# Patient Record
Sex: MALE | Race: WHITE | NOT HISPANIC OR LATINO | Employment: OTHER | URBAN - METROPOLITAN AREA
[De-identification: names, ages, dates, MRNs, and addresses within clinical notes are randomized per-mention and may not be internally consistent; named-entity substitution may affect disease eponyms.]

---

## 2017-01-03 PROBLEM — E78.1 TYPE 2 DIABETES MELLITUS WITH HYPERTRIGLYCERIDEMIA: Status: ACTIVE | Noted: 2017-01-03

## 2017-01-03 PROBLEM — N18.30 ACUTE RENAL FAILURE SUPERIMPOSED ON STAGE 3 CHRONIC KIDNEY DISEASE: Status: ACTIVE | Noted: 2017-01-03

## 2017-01-03 PROBLEM — R65.10: Status: ACTIVE | Noted: 2017-01-03

## 2017-01-03 PROBLEM — E11.69 TYPE 2 DIABETES MELLITUS WITH HYPERTRIGLYCERIDEMIA: Status: ACTIVE | Noted: 2017-01-03

## 2017-01-03 PROBLEM — N17.9 ACUTE RENAL FAILURE SUPERIMPOSED ON STAGE 3 CHRONIC KIDNEY DISEASE: Status: ACTIVE | Noted: 2017-01-03

## 2017-01-03 PROBLEM — I63.9 CVA (CEREBRAL VASCULAR ACCIDENT): Status: ACTIVE | Noted: 2017-01-03

## 2017-01-03 PROBLEM — D72.829 LEUKOCYTOSIS: Status: ACTIVE | Noted: 2017-01-03

## 2017-01-09 ENCOUNTER — TELEPHONE (OUTPATIENT)
Dept: NEPHROLOGY | Facility: CLINIC | Age: 47
End: 2017-01-09

## 2017-01-10 ENCOUNTER — PATIENT OUTREACH (OUTPATIENT)
Dept: ADMINISTRATIVE | Facility: CLINIC | Age: 47
End: 2017-01-10
Payer: COMMERCIAL

## 2017-01-12 ENCOUNTER — TELEPHONE (OUTPATIENT)
Dept: GASTROENTEROLOGY | Facility: CLINIC | Age: 47
End: 2017-01-12

## 2017-01-30 ENCOUNTER — PATIENT OUTREACH (OUTPATIENT)
Dept: ADMINISTRATIVE | Facility: CLINIC | Age: 47
End: 2017-01-30
Payer: MEDICARE

## 2017-01-30 RX ORDER — INSULIN LISPRO 100 [IU]/ML
INJECTION, SOLUTION INTRAVENOUS; SUBCUTANEOUS
COMMUNITY
End: 2017-07-20

## 2017-01-30 RX ORDER — ONDANSETRON 4 MG/1
4 TABLET, ORALLY DISINTEGRATING ORAL EVERY 6 HOURS PRN
COMMUNITY
End: 2018-03-01

## 2017-01-30 RX ORDER — BACLOFEN 10 MG/1
10 TABLET ORAL 3 TIMES DAILY
COMMUNITY
End: 2017-08-10

## 2017-01-30 NOTE — PATIENT INSTRUCTIONS
Discharge Instructions for Acute Pancreatitis  You have been diagnosed with acute pancreatitis. Your pancreas is inflamed or swollen. The pancreas is an organ that produces chemicals and hormones that help you digest food and use sugar for energy. Gallstones are one of the most common causes of this condition. These hard stones form in the gallbladder, which shares a passage with the pancreas into the small intestine. If gallstones block this passage, fluid cant escape the pancreas. The fluid backs up and causes inflammation.  Immediate home care  · Find someone to drive you to appointments. Acute pancreatitis is a serious condition, and you should never drive if you are experiencing symptoms.  · Stop drinking if your illness was caused by alcohol.  ¨ Ask your doctor about alcohol abuse programs and support groups such as Alcoholics Anonymous.  ¨ Ask your doctor about prescription medications that can help you stop drinking.  · Take your medications exactly as directed. Dont skip doses.  · Eat a low-fat diet. Ask your doctor for menus and other diet information.  · Learn to take your own pulse. Keep a record of your results. Ask your doctor which readings mean that you need medical attention.  Ongoing care  · Tell your doctor about any medications you are taking. Some can cause acute pancreatitis.  · Tell your doctor if you lose weight without dieting.  · Watch for symptoms that indicate your pancreatitis has returned. These symptoms include abdominal pain, nausea and vomiting, and fever.  · Keep all follow-up appointments with your doctor. Delayed complications are common with this illness.  Follow-up  Make a follow-up appointment as directed by our staff.     When to call your doctor  Call your doctor immediately if you have any of the following:  · Fever of 100.4°F (38.0°C) or higher  · Severe pain from your upper abdomen to your back  · Nausea and vomiting  · Dizziness or lightheadedness  · Yellowing of your  skin or eyes (jaundice)  · Bruises on your abdomen or back  · Abdominal swelling and tenderness  · Rapid pulse  · Shallow, fast breathing   © 6854-2385 [employer]. [address] [place] [address], [place] [address]. All rights reserved. This information is not intended as a substitute for professional medical care. Always follow your healthcare professional's instructions.

## 2017-05-05 ENCOUNTER — TELEPHONE (OUTPATIENT)
Dept: GASTROENTEROLOGY | Facility: CLINIC | Age: 47
End: 2017-05-05

## 2017-05-09 ENCOUNTER — TELEPHONE (OUTPATIENT)
Dept: GASTROENTEROLOGY | Facility: CLINIC | Age: 47
End: 2017-05-09

## 2017-05-09 NOTE — TELEPHONE ENCOUNTER
----- Message from Teena Hess sent at 5/8/2017  2:30 PM CDT -----  New patient no. [phone]    Patient returned your call.

## 2017-05-10 ENCOUNTER — TELEPHONE (OUTPATIENT)
Dept: GASTROENTEROLOGY | Facility: CLINIC | Age: 47
End: 2017-05-10

## 2017-05-11 ENCOUNTER — TELEPHONE (OUTPATIENT)
Dept: GASTROENTEROLOGY | Facility: CLINIC | Age: 47
End: 2017-05-11

## 2017-05-11 NOTE — TELEPHONE ENCOUNTER
----- Message from Selina Gudino sent at 5/11/2017  8:24 AM CDT -----  Contact: [phone] or [phone]   Patient called in returning your call. Please advise

## 2017-05-11 NOTE — TELEPHONE ENCOUNTER
----- Message from Dorothy Helm sent at 5/10/2017  2:50 PM CDT -----  Contact: mother /[phone]  Pt mother would like for you to call her /[phone]. Please advise.

## 2017-05-19 ENCOUNTER — TELEPHONE (OUTPATIENT)
Dept: GASTROENTEROLOGY | Facility: CLINIC | Age: 47
End: 2017-05-19

## 2017-07-20 ENCOUNTER — OFFICE VISIT (OUTPATIENT)
Dept: GASTROENTEROLOGY | Facility: CLINIC | Age: 47
End: 2017-07-20
Payer: MEDICARE

## 2017-07-20 VITALS — WEIGHT: 244.69 LBS | BODY MASS INDEX: 40.77 KG/M2 | HEIGHT: 65 IN

## 2017-07-20 DIAGNOSIS — K85.90 PANCREATITIS, UNSPECIFIED PANCREATITIS TYPE: Primary | ICD-10-CM

## 2017-07-20 PROCEDURE — 99999 PR PBB SHADOW E&M-EST. PATIENT-LVL II: CPT | Mod: PBBFAC,,, | Performed by: INTERNAL MEDICINE

## 2017-07-20 PROCEDURE — 99214 OFFICE O/P EST MOD 30 MIN: CPT | Mod: S$GLB,,, | Performed by: INTERNAL MEDICINE

## 2017-07-20 RX ORDER — SIMVASTATIN 20 MG/1
40 TABLET, FILM COATED ORAL
COMMUNITY
Start: 2017-07-13 | End: 2017-08-10 | Stop reason: DRUGHIGH

## 2017-07-20 RX ORDER — INSULIN ASPART 100 [IU]/ML
20 INJECTION, SOLUTION INTRAVENOUS; SUBCUTANEOUS
COMMUNITY
Start: 2016-09-29

## 2017-07-20 RX ORDER — ERGOCALCIFEROL 1.25 MG/1
50000 CAPSULE ORAL
COMMUNITY
Start: 2017-07-13

## 2017-07-20 RX ORDER — INSULIN GLARGINE 100 [IU]/ML
30 INJECTION, SOLUTION SUBCUTANEOUS 2 TIMES DAILY
COMMUNITY
Start: 2016-09-29 | End: 2018-07-12

## 2017-07-20 RX ORDER — HYDROGEN PEROXIDE 3 %
20 SOLUTION, NON-ORAL MISCELLANEOUS
COMMUNITY
End: 2017-08-10

## 2017-07-20 RX ORDER — FERROUS SULFATE 325(65) MG
325 TABLET ORAL
COMMUNITY
Start: 2017-06-21

## 2017-07-20 RX ORDER — METOPROLOL SUCCINATE 50 MG/1
100 TABLET, EXTENDED RELEASE ORAL DAILY
COMMUNITY

## 2017-07-20 RX ORDER — AMLODIPINE BESYLATE 5 MG/1
10 TABLET ORAL
COMMUNITY
Start: 2017-07-13 | End: 2018-03-01

## 2017-07-20 RX ORDER — BACLOFEN 10 MG/1
TABLET ORAL
COMMUNITY
Start: 2015-04-27 | End: 2017-07-20 | Stop reason: SDUPTHER

## 2017-07-20 NOTE — LETTER
July 23, 2017      Kris Reno, [facility]  [address] [place] [address]           [place] [facility]  [address]  [place] [address]  Phone: [phone]          Patient: Stephon Lock   MR Number: [id number]   YOB: 1970   Date of Visit: 7/20/2017       Dear Kris Reno:    Thank you for referring Stephon Lock to me for evaluation. Attached you will find relevant portions of my assessment and plan of care.    If you have questions, please do not hesitate to call me. I look forward to following Stephon Lock along with you.    Sincerely,        Enclosure  CC:  No Recipients    If you would like to receive this communication electronically, please contact [email or web address] or [phone] to request more information on [employer] access.    For providers and/or their staff who would like to refer a patient to [facility], please contact us through our one-stop-shop provider referral line, [facility], at [phone].    If you feel you have received this communication in error or would no longer like to receive these types of communications, please e-mail [email or web address]

## 2017-07-20 NOTE — PROGRESS NOTES
"Subjective:       Patient ID: Stephon Lock is a 47 y.o. male.    Chief Complaint: Pancreatitis    HPI     Pancreatitis/ Idiopathic   Patient complains of pancreatitis type pain. Symptoms have been present several months. Symptoms include abdominal pain. Symptoms have been gradually worsening.  The pain is located in the epigastrium, is described as boring, moderate, and is rated worst day -4/ 10. The pain radiates to the abdomen diffusely. The patient states that the pain is relieved by pain medication. Aggravating factors include food ingestion. The patient has tried narcotics with moderate relief.  Alcohol history:none. Prior diagnostic studies: CT Scan  Shows pseudocyst     C/O" extreme fatigue " .    Review of Systems   Constitutional: Positive for appetite change. Negative for activity change, fever and unexpected weight change.   HENT: Negative for congestion, trouble swallowing and voice change.    Respiratory: Negative for apnea, wheezing and stridor.    Cardiovascular: Negative for chest pain.   Gastrointestinal: Negative for abdominal distention, abdominal pain, blood in stool, rectal pain and vomiting.   Genitourinary: Negative for flank pain and urgency.   Musculoskeletal: Negative for arthralgias, neck pain and neck stiffness.   Skin: Negative for pallor and rash.   Neurological: Negative for dizziness, tremors and weakness.   Hematological: Negative for adenopathy.   Psychiatric/Behavioral: The patient is not nervous/anxious.                Objective:         Physical Exam   Constitutional: He is oriented to person, place, and time. He appears well-developed and well-nourished.   HENT:   Head: Normocephalic and atraumatic.   Eyes: EOM are normal. Pupils are equal, round, and reactive to light. Left eye exhibits no discharge. No scleral icterus.   Neck: Normal range of motion. Neck supple. No JVD present. No tracheal deviation present.   Cardiovascular: Regular rhythm and normal heart sounds.  Exam " reveals no gallop and no friction rub.    Pulmonary/Chest: Effort normal and breath sounds normal. He has no wheezes. He has no rales.   Abdominal: There is no tenderness. There is no rebound and no guarding. No hernia.   Musculoskeletal: He exhibits no edema or tenderness.   Neurological: He is alert and oriented to person, place, and time. He displays normal reflexes. Coordination normal.   Skin: Skin is warm. No rash noted.   Psychiatric: His behavior is normal. Thought content normal.   Vitals reviewed.              Assessment:     Idiopathic Pancreatitis   Fatigue related to pancreatitis  Pseudocyst   Plan:     EUS   CBC  CMP  Amylase   Lipase

## 2017-07-25 DIAGNOSIS — K85.90 PANCREATITIS, UNSPECIFIED PANCREATITIS TYPE: Primary | ICD-10-CM

## 2017-07-25 DIAGNOSIS — K85.90 PANCREATITIS: ICD-10-CM

## 2017-07-25 RX ORDER — SODIUM CHLORIDE 9 MG/ML
INJECTION, SOLUTION INTRAVENOUS CONTINUOUS
Status: CANCELLED | OUTPATIENT
Start: 2017-07-25

## 2017-07-26 ENCOUNTER — ANESTHESIA EVENT (OUTPATIENT)
Dept: ENDOSCOPY | Facility: HOSPITAL | Age: 47
End: 2017-07-26
Payer: MEDICARE

## 2017-07-26 ENCOUNTER — HOSPITAL ENCOUNTER (OUTPATIENT)
Facility: HOSPITAL | Age: 47
Discharge: HOME | End: 2017-07-26
Attending: INTERNAL MEDICINE | Admitting: INTERNAL MEDICINE
Payer: MEDICARE

## 2017-07-26 ENCOUNTER — ANESTHESIA (OUTPATIENT)
Dept: ENDOSCOPY | Facility: HOSPITAL | Age: 47
End: 2017-07-26
Payer: MEDICARE

## 2017-07-26 VITALS
HEART RATE: 64 BPM | RESPIRATION RATE: 18 BRPM | BODY MASS INDEX: 38.57 KG/M2 | OXYGEN SATURATION: 98 % | HEIGHT: 66 IN | DIASTOLIC BLOOD PRESSURE: 99 MMHG | WEIGHT: 240 LBS | TEMPERATURE: 97 F | SYSTOLIC BLOOD PRESSURE: 149 MMHG

## 2017-07-26 DIAGNOSIS — K85.90 PANCREATITIS, UNSPECIFIED PANCREATITIS TYPE: ICD-10-CM

## 2017-07-26 DIAGNOSIS — I10 HYPERTENSION: ICD-10-CM

## 2017-07-26 DIAGNOSIS — K85.90 PANCREATITIS: ICD-10-CM

## 2017-07-26 LAB — GLUCOSE SERPL-MCNC: 296 MG/DL (ref 70–110)

## 2017-07-26 PROCEDURE — 88305 TISSUE EXAM BY PATHOLOGIST: CPT | Performed by: PATHOLOGY

## 2017-07-26 PROCEDURE — 27201012 HC FORCEPS, HOT/COLD, DISP: Performed by: INTERNAL MEDICINE

## 2017-07-26 PROCEDURE — 43239 EGD BIOPSY SINGLE/MULTIPLE: CPT | Mod: 59,,, | Performed by: INTERNAL MEDICINE

## 2017-07-26 PROCEDURE — 43239 EGD BIOPSY SINGLE/MULTIPLE: CPT | Performed by: INTERNAL MEDICINE

## 2017-07-26 PROCEDURE — 37000008 HC ANESTHESIA 1ST 15 MINUTES: Performed by: INTERNAL MEDICINE

## 2017-07-26 PROCEDURE — 25000003 PHARM REV CODE 250: Performed by: ANESTHESIOLOGY

## 2017-07-26 PROCEDURE — 37000009 HC ANESTHESIA EA ADD 15 MINS: Performed by: INTERNAL MEDICINE

## 2017-07-26 PROCEDURE — 43242 EGD US FINE NEEDLE BX/ASPIR: CPT | Performed by: INTERNAL MEDICINE

## 2017-07-26 PROCEDURE — 27202059 HC NEEDLE, FNA (ANY): Performed by: INTERNAL MEDICINE

## 2017-07-26 PROCEDURE — 93005 ELECTROCARDIOGRAM TRACING: CPT

## 2017-07-26 PROCEDURE — 63600175 PHARM REV CODE 636 W HCPCS: Performed by: NURSE ANESTHETIST, CERTIFIED REGISTERED

## 2017-07-26 PROCEDURE — 25000003 PHARM REV CODE 250: Performed by: INTERNAL MEDICINE

## 2017-07-26 PROCEDURE — 88305 TISSUE EXAM BY PATHOLOGIST: CPT | Mod: 26,,, | Performed by: PATHOLOGY

## 2017-07-26 PROCEDURE — 93010 ELECTROCARDIOGRAM REPORT: CPT | Mod: ,,, | Performed by: INTERNAL MEDICINE

## 2017-07-26 PROCEDURE — 88112 CYTOPATH CELL ENHANCE TECH: CPT | Mod: 26,,, | Performed by: PATHOLOGY

## 2017-07-26 PROCEDURE — 43238 EGD US FINE NEEDLE BX/ASPIR: CPT | Mod: ,,, | Performed by: INTERNAL MEDICINE

## 2017-07-26 PROCEDURE — 88112 CYTOPATH CELL ENHANCE TECH: CPT | Performed by: PATHOLOGY

## 2017-07-26 PROCEDURE — 25000003 PHARM REV CODE 250: Performed by: NURSE ANESTHETIST, CERTIFIED REGISTERED

## 2017-07-26 RX ORDER — SODIUM CHLORIDE 9 MG/ML
INJECTION, SOLUTION INTRAVENOUS CONTINUOUS
Status: DISCONTINUED | OUTPATIENT
Start: 2017-07-26 | End: 2017-07-26 | Stop reason: HOSPADM

## 2017-07-26 RX ORDER — METOPROLOL TARTRATE 1 MG/ML
5 INJECTION, SOLUTION INTRAVENOUS ONCE
Status: COMPLETED | OUTPATIENT
Start: 2017-07-26 | End: 2017-07-26

## 2017-07-26 RX ORDER — PROPOFOL 10 MG/ML
VIAL (ML) INTRAVENOUS
Status: DISCONTINUED | OUTPATIENT
Start: 2017-07-26 | End: 2017-07-26

## 2017-07-26 RX ORDER — FENTANYL CITRATE 50 UG/ML
INJECTION, SOLUTION INTRAMUSCULAR; INTRAVENOUS
Status: DISCONTINUED | OUTPATIENT
Start: 2017-07-26 | End: 2017-07-26

## 2017-07-26 RX ORDER — LIDOCAINE HCL/PF 100 MG/5ML
SYRINGE (ML) INTRAVENOUS
Status: DISCONTINUED | OUTPATIENT
Start: 2017-07-26 | End: 2017-07-26

## 2017-07-26 RX ORDER — METOPROLOL TARTRATE 1 MG/ML
2 INJECTION, SOLUTION INTRAVENOUS ONCE
Status: DISCONTINUED | OUTPATIENT
Start: 2017-07-26 | End: 2017-07-26

## 2017-07-26 RX ORDER — NAPROXEN SODIUM 220 MG/1
81 TABLET, FILM COATED ORAL DAILY
COMMUNITY

## 2017-07-26 RX ORDER — PROPOFOL 10 MG/ML
VIAL (ML) INTRAVENOUS CONTINUOUS PRN
Status: DISCONTINUED | OUTPATIENT
Start: 2017-07-26 | End: 2017-07-26

## 2017-07-26 RX ORDER — GLYCOPYRROLATE 0.2 MG/ML
INJECTION INTRAMUSCULAR; INTRAVENOUS
Status: DISCONTINUED | OUTPATIENT
Start: 2017-07-26 | End: 2017-07-26

## 2017-07-26 RX ADMIN — TOPICAL ANESTHETIC 1 EACH: 200 SPRAY DENTAL; PERIODONTAL at 02:07

## 2017-07-26 RX ADMIN — LIDOCAINE HYDROCHLORIDE 100 MG: 20 INJECTION, SOLUTION INTRAVENOUS at 02:07

## 2017-07-26 RX ADMIN — PROPOFOL 40 MG: 10 INJECTION, EMULSION INTRAVENOUS at 02:07

## 2017-07-26 RX ADMIN — SODIUM CHLORIDE: 0.9 INJECTION, SOLUTION INTRAVENOUS at 02:07

## 2017-07-26 RX ADMIN — GLYCOPYRROLATE 0.2 MG: 0.2 INJECTION, SOLUTION INTRAMUSCULAR; INTRAVENOUS at 02:07

## 2017-07-26 RX ADMIN — PROPOFOL 150 MCG/KG/MIN: 10 INJECTION, EMULSION INTRAVENOUS at 02:07

## 2017-07-26 RX ADMIN — METOPROLOL TARTRATE 5 MG: 5 INJECTION INTRAVENOUS at 01:07

## 2017-07-26 RX ADMIN — FENTANYL CITRATE 50 MCG: 50 INJECTION, SOLUTION INTRAMUSCULAR; INTRAVENOUS at 02:07

## 2017-07-26 NOTE — TRANSFER OF CARE
"Anesthesia Transfer of Care Note    Patient: Stephon Lock    Procedure(s) Performed: Procedure(s) (LRB):  ULTRASOUND-ENDOSCOPIC-UPPER (N/A)    Patient location: GI    Anesthesia Type: MAC and epidural    Transport from OR: Transported from OR on room air with adequate spontaneous ventilation    Post pain: adequate analgesia    Post assessment: no apparent anesthetic complications    Post vital signs: stable    Level of consciousness: responds to stimulation and sedated    Nausea/Vomiting: no nausea/vomiting    Complications: none    Transfer of care protocol was followed      Last vitals:   Visit Vitals  BP (!) 173/91   Pulse 68   Temp 36.9 °C (98.4 °F) (Oral)   Resp 20   Ht 5' 6" (1.676 m)   Wt 108.9 kg (240 lb)   SpO2 99%   BMI 38.74 kg/m²     "

## 2017-07-26 NOTE — ANESTHESIA POSTPROCEDURE EVALUATION
"Anesthesia Post Evaluation    Patient: Stephon Lock    Procedure(s) Performed: Procedure(s) (LRB):  ULTRASOUND-ENDOSCOPIC-UPPER (N/A)    Final Anesthesia Type: MAC  Patient location during evaluation: GI PACU  Patient participation: Yes- Able to Participate  Level of consciousness: awake and alert  Post-procedure vital signs: reviewed and stable  Pain management: adequate  Airway patency: patent  PONV status at discharge: No PONV  Anesthetic complications: no      Cardiovascular status: hemodynamically stable  Respiratory status: unassisted, spontaneous ventilation and room air  Hydration status: euvolemic  Follow-up not needed.        Visit Vitals  BP (!) 173/91   Pulse 68   Temp 36.9 °C (98.4 °F) (Oral)   Resp 20   Ht 5' 6" (1.676 m)   Wt 108.9 kg (240 lb)   SpO2 99%   BMI 38.74 kg/m²       Pain/Sebas Score: Pain Assessment Performed: Yes (7/26/2017  1:50 PM)  Presence of Pain: denies (7/26/2017  1:50 PM)      "

## 2017-07-26 NOTE — DISCHARGE INSTRUCTIONS
Post Upper EUS Instructions:     Stephon Lock  7/26/2017  Alejandro Decker MD    1. Do Not eat or drink anything for 1 hour. Try sips of water first. If tolerated, resume your regular diet or one recommended by your physician.  2. Do not drive, or operate machinery, make critical decisions, or do activities that require coordination or balance for 24 hours.  3. You may experience a sore throat for 24 to 48 hours. You may use throat lozenges or gargle with warm salt water to relieve the discomfort.  4. Because air was put into your stomach during the procedure, you may experience some belching.   Go directly to the emergency room if you notice any of the following:                              Chills and/or fever over 101                Persistent vomiting or vomiting with blood                Severe abdominal pain, other than gas cramps                Severe chest pain                Black, tarry stools    If you have any questions or problems, please call your Physician:    Alejandro Decker MD     Lab Results: [phone]    If a complication or emergency situation arises and you are unable to reach your Physician - GO TO THE EMERGENCY ROOM.

## 2017-07-26 NOTE — ANESTHESIA PREPROCEDURE EVALUATION
07/26/2017  Stephon Lock is a 47 y.o., male for upper EUS under MAC. Pt obese, sleep apnea.     Anesthesia Evaluation     I have reviewed the Nursing Notes.   I have reviewed the Medications.     Review of Systems  Anesthesia Hx:   Denies Personal Hx of Anesthesia complications.   Social:  No Alcohol Use, Non-Smoker   Cardiovascular:   Exercise tolerance: good Hypertension ECG has been reviewed.    Renal/:   Chronic Renal Disease, CRI    Neurological:   CVA (7/2014)    Endocrine:   Diabetes        Physical Exam  General:  Obesity    Airway/Jaw/Neck:  Airway Findings: Tongue: Large Mallampati: III  Jaw/Neck Findings:  Neck Findings:  Girth Increased      Dental:  Dental Findings: Periodontal disease, Severe    Chest/Lungs:  Chest/Lungs Clear    Heart/Vascular:  Heart Findings: Normal       Mental Status:  Mental Status Findings:  Alert and Oriented         Anesthesia Plan  Type of Anesthesia, risks & benefits discussed:  Anesthesia Type:  MAC, general  Patient's Preference:   Intra-op Monitoring Plan:   Intra-op Monitoring Plan Comments:   Post Op Pain Control Plan:   Post Op Pain Control Plan Comments:   Induction:    Beta Blocker:  Patient is on a Beta-Blocker and has received one dose within the past 24 hours (No further documentation required).     Beta Blocker Comments: Given metoprolol on arrival; pt out of it for a week  Informed Consent: Patient understands risks and agrees with Anesthesia plan.  Questions answered. Anesthesia consent signed with patient.  ASA Score: 3     Day of Surgery Review of History & Physical:            Ready For Surgery From Anesthesia Perspective.

## 2017-08-07 ENCOUNTER — TELEPHONE (OUTPATIENT)
Dept: GASTROENTEROLOGY | Facility: CLINIC | Age: 47
End: 2017-08-07

## 2017-08-07 NOTE — TELEPHONE ENCOUNTER
----- Message from Selina Gudino sent at 8/7/2017  9:22 AM CDT -----  Contact: [phone]/ Leidy pt's wife   Pt's wife its requesting pt's biopsy test results done two weeks ago . Pt also needs to know what to do next . Pt also its having severe stomach pain. Please advise

## 2017-08-07 NOTE — TELEPHONE ENCOUNTER
Spoke with pts mother, informed of results and pt has a f/u appt this week with Dr Decker to discuss abd pain

## 2017-08-10 ENCOUNTER — OFFICE VISIT (OUTPATIENT)
Dept: GASTROENTEROLOGY | Facility: CLINIC | Age: 47
End: 2017-08-10
Payer: MEDICARE

## 2017-08-10 ENCOUNTER — HOSPITAL ENCOUNTER (OUTPATIENT)
Dept: RADIOLOGY | Facility: HOSPITAL | Age: 47
Discharge: HOME | End: 2017-08-10
Attending: INTERNAL MEDICINE
Payer: MEDICARE

## 2017-08-10 VITALS
BODY MASS INDEX: 38.58 KG/M2 | HEIGHT: 66 IN | SYSTOLIC BLOOD PRESSURE: 168 MMHG | WEIGHT: 240.06 LBS | DIASTOLIC BLOOD PRESSURE: 88 MMHG

## 2017-08-10 DIAGNOSIS — R10.9 ABDOMINAL PAIN, UNSPECIFIED LOCATION: Primary | ICD-10-CM

## 2017-08-10 DIAGNOSIS — R10.9 ABDOMINAL PAIN, UNSPECIFIED LOCATION: ICD-10-CM

## 2017-08-10 PROCEDURE — 3077F SYST BP >= 140 MM HG: CPT | Mod: S$GLB,,, | Performed by: INTERNAL MEDICINE

## 2017-08-10 PROCEDURE — 74150 CT ABDOMEN W/O CONTRAST: CPT | Mod: TC

## 2017-08-10 PROCEDURE — 3008F BODY MASS INDEX DOCD: CPT | Mod: S$GLB,,, | Performed by: INTERNAL MEDICINE

## 2017-08-10 PROCEDURE — 25500020 PHARM REV CODE 255: Performed by: INTERNAL MEDICINE

## 2017-08-10 PROCEDURE — 74150 CT ABDOMEN W/O CONTRAST: CPT | Mod: 26,,, | Performed by: RADIOLOGY

## 2017-08-10 PROCEDURE — 3079F DIAST BP 80-89 MM HG: CPT | Mod: S$GLB,,, | Performed by: INTERNAL MEDICINE

## 2017-08-10 PROCEDURE — 99999 PR PBB SHADOW E&M-EST. PATIENT-LVL III: CPT | Mod: PBBFAC,,, | Performed by: INTERNAL MEDICINE

## 2017-08-10 PROCEDURE — 99212 OFFICE O/P EST SF 10 MIN: CPT | Mod: S$GLB,,, | Performed by: INTERNAL MEDICINE

## 2017-08-10 RX ORDER — SIMVASTATIN 40 MG/1
40 TABLET, FILM COATED ORAL NIGHTLY
COMMUNITY
Start: 2017-08-02

## 2017-08-10 RX ORDER — CYCLOBENZAPRINE HCL 10 MG
TABLET ORAL
COMMUNITY
Start: 2017-08-02

## 2017-08-10 RX ADMIN — IOHEXOL 15 ML: 350 INJECTION, SOLUTION INTRAVENOUS at 04:08

## 2017-08-15 ENCOUNTER — TELEPHONE (OUTPATIENT)
Dept: GASTROENTEROLOGY | Facility: CLINIC | Age: 47
End: 2017-08-15

## 2017-08-15 NOTE — TELEPHONE ENCOUNTER
----- Message from Amy Guadalupe sent at 8/15/2017 12:22 PM CDT -----  Contact: [phone]/ pt's mom    Patient requesting to speak with you regarding the procedure that was supposed to be set up to drain his cyst. Patient wants it on a Tuesday or Wednesday or Thursday around 4 pm. Please advise.

## 2017-08-16 NOTE — TELEPHONE ENCOUNTER
----- Message from Esther Melo sent at 8/16/2017  9:52 AM CDT -----  Contact: [phone] /  Leidy richardson mother  States she was suppose to hear from the office regarding setting the patient up for a procedure. Requests a call as soon as possible. Please advise.

## 2017-08-17 ENCOUNTER — TELEPHONE (OUTPATIENT)
Dept: GASTROENTEROLOGY | Facility: CLINIC | Age: 47
End: 2017-08-17

## 2017-08-17 DIAGNOSIS — K86.2 PANCREATIC CYST: Primary | ICD-10-CM

## 2017-08-17 RX ORDER — CIPROFLOXACIN 500 MG/1
500 TABLET ORAL EVERY 12 HOURS
Qty: 20 TABLET | Refills: 0 | Status: SHIPPED | OUTPATIENT
Start: 2017-08-17 | End: 2017-08-27

## 2017-08-17 NOTE — TELEPHONE ENCOUNTER
Started speaking with Leidy and call lost, attempted to call back x3 and caller not available.    Not sure by message which pharmacy pts mother wanted medication to go to. [facility].

## 2017-08-17 NOTE — TELEPHONE ENCOUNTER
----- Message from Amalia Rod sent at 8/16/2017  5:14 PM CDT -----  Contact: mother Forbes, [phone]  Called in regards to Cipro prescription. [facility] does not have order and it should have gone to [facility] in [place]. Please advise.

## 2017-08-17 NOTE — TELEPHONE ENCOUNTER
Spoke with pt and he stated that [facility] not [facility].    Verifed with pharmacy that they did get Rx last night but did not start working on it until this morning

## 2017-08-17 NOTE — TELEPHONE ENCOUNTER
----- Message from Jennifer Muñiz sent at 8/17/2017  8:03 AM CDT -----  Contact: self/[phone]  He is returning the nurse's call.

## 2017-08-21 NOTE — PROGRESS NOTES
Subjective:       Patient ID: Stephon Lock is a 47 y.o. male.    Chief Complaint: Follow-up and Abdominal Pain    HPI   Abdominal Pain/ H/O biliary pancreatitis , bile leak , pseudocyst   Patient complains of abdominal pain. The pain is located didffusely . The pain is described as dull, and is 2}/10 in intensity. Onset was months  ago. Symptoms have been progressive since pancreatitis  since. Aggravating factors includemeals.  Alleviating factors include NPO. Associated symptoms include bloating. The patient denies nausea .        Review of Systems   Constitutional: Negative for activity change and fatigue.   HENT: Negative for congestion, trouble swallowing and voice change.    Respiratory: Negative for cough and choking.    Gastrointestinal: Negative for abdominal distention, abdominal pain, rectal pain and vomiting.   Genitourinary: Negative for dysuria and hematuria.   Musculoskeletal: Negative for arthralgias, neck pain and neck stiffness.   Skin: Negative for pallor and rash.   Neurological: Negative for dizziness, tremors and weakness.   Psychiatric/Behavioral: Negative for agitation, sleep disturbance and suicidal ideas. The patient is not nervous/anxious.    All other systems reviewed and are negative.          Objective:      Physical Exam   Constitutional: He is oriented to person, place, and time. He appears well-developed and well-nourished.   HENT:   Head: Normocephalic and atraumatic.   Eyes: EOM are normal. Pupils are equal, round, and reactive to light. Left eye exhibits no discharge. No scleral icterus.   Neck: Normal range of motion. Neck supple.   Cardiovascular: Normal rate and regular rhythm.  Exam reveals no friction rub.    No murmur heard.  Pulmonary/Chest: Effort normal. He has no wheezes. He has no rales.   Abdominal: Soft. Bowel sounds are normal. He exhibits no mass. There is no rebound and no guarding.   Musculoskeletal: He exhibits no tenderness or deformity.   Neurological: He is  alert and oriented to person, place, and time. He displays normal reflexes. Coordination normal.   Skin: Skin is warm. No rash noted. No erythema. No pallor.   Psychiatric: His behavior is normal. Thought content normal.   Vitals reviewed.          Assessment:       1. Abdominal pain, unspecified location      H/O pseudocyst and pancreatitis   Plan:   Review CT to evaluate possibility of drainage   Refer to surgery   D/W referring physician

## 2017-08-23 ENCOUNTER — TELEPHONE (OUTPATIENT)
Dept: GASTROENTEROLOGY | Facility: CLINIC | Age: 47
End: 2017-08-23

## 2017-08-23 NOTE — TELEPHONE ENCOUNTER
----- Message from Aida Pratt sent at 8/23/2017 10:13 AM CDT -----  Contact: [phone]/self  Patient called in returning your call. Please advise.

## 2017-08-23 NOTE — TELEPHONE ENCOUNTER
Spoke with pts mother, informed of MD that will do procedure. She stated that she will call the local MD that referred to Dr Decker and inquire about what she should do as there was another MD in [place], [place] which is closer. She will let me know if they still need to come here for procedure

## 2017-08-23 NOTE — TELEPHONE ENCOUNTER
----- Message from Cassi Babin sent at 8/23/2017 10:39 AM CDT -----  Contact: [phone]/self  Pt states she's returning your call.  Please advise

## 2017-09-06 ENCOUNTER — TELEPHONE (OUTPATIENT)
Dept: GASTROENTEROLOGY | Facility: CLINIC | Age: 47
End: 2017-09-06

## 2017-09-06 DIAGNOSIS — K86.2 PANCREATIC CYST: Primary | ICD-10-CM

## 2017-09-06 NOTE — TELEPHONE ENCOUNTER
----- Message from Esther Melo sent at 9/5/2017  8:32 AM CDT -----  Contact: [phone] / Leidy pt mother or  [phone] / Stephon  States they are still waiting to hear from someone in the office regarding rescheduling the patient procedure. Patient's mother states this is an emergency that the patient have the procedure as soon as possible. Please advise.

## 2017-09-12 ENCOUNTER — ANESTHESIA (OUTPATIENT)
Dept: ENDOSCOPY | Facility: HOSPITAL | Age: 47
End: 2017-09-12
Payer: MEDICARE

## 2017-09-12 ENCOUNTER — SURGERY (OUTPATIENT)
Age: 47
End: 2017-09-12

## 2017-09-12 ENCOUNTER — HOSPITAL ENCOUNTER (OUTPATIENT)
Facility: HOSPITAL | Age: 47
Discharge: HOME | End: 2017-09-12
Attending: INTERNAL MEDICINE | Admitting: INTERNAL MEDICINE
Payer: MEDICARE

## 2017-09-12 ENCOUNTER — HOSPITAL ENCOUNTER (OUTPATIENT)
Dept: RADIOLOGY | Facility: HOSPITAL | Age: 47
Discharge: HOME | End: 2017-09-12
Attending: INTERNAL MEDICINE
Payer: MEDICARE

## 2017-09-12 ENCOUNTER — TELEPHONE (OUTPATIENT)
Dept: GASTROENTEROLOGY | Facility: CLINIC | Age: 47
End: 2017-09-12

## 2017-09-12 ENCOUNTER — ANESTHESIA EVENT (OUTPATIENT)
Dept: ENDOSCOPY | Facility: HOSPITAL | Age: 47
End: 2017-09-12
Payer: MEDICARE

## 2017-09-12 DIAGNOSIS — K86.2 CYST OF PANCREAS: ICD-10-CM

## 2017-09-12 DIAGNOSIS — K86.89 NECROSIS OF PANCREAS: ICD-10-CM

## 2017-09-12 DIAGNOSIS — K86.89 NECROSIS OF PANCREAS: Primary | ICD-10-CM

## 2017-09-12 LAB — GLUCOSE SERPL-MCNC: 303 MG/DL (ref 70–110)

## 2017-09-12 PROCEDURE — 63600175 PHARM REV CODE 636 W HCPCS: Performed by: NURSE ANESTHETIST, CERTIFIED REGISTERED

## 2017-09-12 PROCEDURE — 43259 EGD US EXAM DUODENUM/JEJUNUM: CPT | Performed by: INTERNAL MEDICINE

## 2017-09-12 PROCEDURE — 63600175 PHARM REV CODE 636 W HCPCS: Performed by: INTERNAL MEDICINE

## 2017-09-12 PROCEDURE — 74176 CT ABD & PELVIS W/O CONTRAST: CPT | Mod: 26,,, | Performed by: RADIOLOGY

## 2017-09-12 PROCEDURE — 43237 ENDOSCOPIC US EXAM ESOPH: CPT | Mod: ,,, | Performed by: INTERNAL MEDICINE

## 2017-09-12 PROCEDURE — 25500020 PHARM REV CODE 255: Performed by: INTERNAL MEDICINE

## 2017-09-12 PROCEDURE — 25000003 PHARM REV CODE 250: Performed by: NURSE ANESTHETIST, CERTIFIED REGISTERED

## 2017-09-12 PROCEDURE — 37000009 HC ANESTHESIA EA ADD 15 MINS: Performed by: INTERNAL MEDICINE

## 2017-09-12 PROCEDURE — 37000008 HC ANESTHESIA 1ST 15 MINUTES: Performed by: INTERNAL MEDICINE

## 2017-09-12 PROCEDURE — 74176 CT ABD & PELVIS W/O CONTRAST: CPT | Mod: TC

## 2017-09-12 PROCEDURE — 25000003 PHARM REV CODE 250: Performed by: INTERNAL MEDICINE

## 2017-09-12 RX ORDER — CIPROFLOXACIN 2 MG/ML
400 INJECTION, SOLUTION INTRAVENOUS ONCE
Status: COMPLETED | OUTPATIENT
Start: 2017-09-12 | End: 2017-09-12

## 2017-09-12 RX ORDER — FENTANYL CITRATE 50 UG/ML
INJECTION, SOLUTION INTRAMUSCULAR; INTRAVENOUS
Status: DISCONTINUED | OUTPATIENT
Start: 2017-09-12 | End: 2017-09-12

## 2017-09-12 RX ORDER — SODIUM CHLORIDE 9 MG/ML
INJECTION, SOLUTION INTRAVENOUS CONTINUOUS
Status: DISCONTINUED | OUTPATIENT
Start: 2017-09-12 | End: 2017-09-12 | Stop reason: HOSPADM

## 2017-09-12 RX ORDER — LIDOCAINE HCL/PF 100 MG/5ML
SYRINGE (ML) INTRAVENOUS
Status: DISCONTINUED | OUTPATIENT
Start: 2017-09-12 | End: 2017-09-12

## 2017-09-12 RX ORDER — PROPOFOL 10 MG/ML
VIAL (ML) INTRAVENOUS CONTINUOUS PRN
Status: DISCONTINUED | OUTPATIENT
Start: 2017-09-12 | End: 2017-09-12

## 2017-09-12 RX ORDER — PROPOFOL 10 MG/ML
VIAL (ML) INTRAVENOUS
Status: DISCONTINUED | OUTPATIENT
Start: 2017-09-12 | End: 2017-09-12

## 2017-09-12 RX ADMIN — FENTANYL CITRATE 25 MCG: 50 INJECTION, SOLUTION INTRAMUSCULAR; INTRAVENOUS at 08:09

## 2017-09-12 RX ADMIN — PROPOFOL 50 MG: 10 INJECTION, EMULSION INTRAVENOUS at 08:09

## 2017-09-12 RX ADMIN — IOHEXOL 30 ML: 350 INJECTION, SOLUTION INTRAVENOUS at 11:09

## 2017-09-12 RX ADMIN — CIPROFLOXACIN 400 MG: 2 INJECTION, SOLUTION INTRAVENOUS at 08:09

## 2017-09-12 RX ADMIN — PROPOFOL 150 MCG/KG/MIN: 10 INJECTION, EMULSION INTRAVENOUS at 08:09

## 2017-09-12 RX ADMIN — TOPICAL ANESTHETIC 1 EACH: 200 SPRAY DENTAL; PERIODONTAL at 08:09

## 2017-09-12 RX ADMIN — SODIUM CHLORIDE: 0.9 INJECTION, SOLUTION INTRAVENOUS at 07:09

## 2017-09-12 RX ADMIN — LIDOCAINE HYDROCHLORIDE 100 MG: 20 INJECTION, SOLUTION INTRAVENOUS at 08:09

## 2017-09-12 NOTE — ANESTHESIA PREPROCEDURE EVALUATION
09/12/2017  Stephon Lock is a 47 y.o., male for upper EUS under MAC. Pt obese, sleep apnea.   No problem with previous MAC anesthesia on 7/25/17    Anesthesia Evaluation     I have reviewed the Nursing Notes.   I have reviewed the Medications.     Review of Systems  Anesthesia Hx:   Denies Personal Hx of Anesthesia complications.   Social:  No Alcohol Use, Non-Smoker   Cardiovascular:   Exercise tolerance: good Hypertension ECG has been reviewed.    Renal/:   Chronic Renal Disease, CRI    Neurological:   CVA (7/2014)    Endocrine:   Diabetes        Physical Exam  General:  Obesity    Airway/Jaw/Neck:  Airway Findings: Tongue: Large Mallampati: III  Jaw/Neck Findings:  Neck Findings:  Girth Increased      Dental:  Dental Findings: Periodontal disease, Severe    Chest/Lungs:  Chest/Lungs Clear    Heart/Vascular:  Heart Findings: Normal       Mental Status:  Mental Status Findings:  Alert and Oriented         Anesthesia Plan  Type of Anesthesia, risks & benefits discussed:  Anesthesia Type:  MAC, general  Patient's Preference:   Intra-op Monitoring Plan: standard ASA monitors  Intra-op Monitoring Plan Comments:   Post Op Pain Control Plan: multimodal analgesia  Post Op Pain Control Plan Comments:   Induction:   IV  Beta Blocker:  Patient is on a Beta-Blocker and has received one dose within the past 24 hours (No further documentation required).       Informed Consent: Patient understands risks and agrees with Anesthesia plan.  Questions answered. Anesthesia consent signed with patient.  ASA Score: 3     Day of Surgery Review of History & Physical:            Ready For Surgery From Anesthesia Perspective.

## 2017-09-12 NOTE — ANESTHESIA POSTPROCEDURE EVALUATION
"Anesthesia Post Evaluation    Patient: Stephon Lock    Procedure(s) Performed: Procedure(s) (LRB):  ULTRASOUND-ENDOSCOPIC-UPPER W/FNA -confirmed with lab that previous cyst fluid for CEA level was never received (N/A)    Final Anesthesia Type: MAC  Patient location during evaluation: GI PACU  Patient participation: Yes- Able to Participate  Level of consciousness: awake and alert and oriented  Post-procedure vital signs: reviewed and stable  Pain management: adequate  Airway patency: patent  PONV status at discharge: No PONV  Anesthetic complications: no      Cardiovascular status: blood pressure returned to baseline and hemodynamically stable  Respiratory status: unassisted, spontaneous ventilation and room air  Hydration status: euvolemic  Follow-up not needed.        Visit Vitals  BP (!) 158/85 (BP Location: Right arm, Patient Position: Lying)   Pulse 68   Temp 37.2 °C (98.9 °F) (Oral)   Resp 20   Ht 5' 6" (1.676 m)   Wt 106.6 kg (235 lb)   SpO2 98%   BMI 37.93 kg/m²       Pain/Sebas Score: Pain Assessment Performed: Yes (9/12/2017  7:26 AM)  Presence of Pain: complains of pain/discomfort (9/12/2017  7:26 AM)      "

## 2017-09-12 NOTE — DISCHARGE INSTRUCTIONS
Post Upper EUS Instructions:     Stephon Lock  9/12/2017  Campbell Norman MD    1. Do Not eat or drink anything for 1 hour. Try sips of water first. If tolerated, resume your regular diet or one recommended by your physician.  2. Do not drive, or operate machinery, make critical decisions, or do activities that require coordination or balance for 24 hours.  3. You may experience a sore throat for 24 to 48 hours. You may use throat lozenges or gargle with warm salt water to relieve the discomfort.  4. Because air was put into your stomach during the procedure, you may experience some belching.   Go directly to the emergency room if you notice any of the following:                              Chills and/or fever over 101                Persistent vomiting or vomiting with blood                Severe abdominal pain, other than gas cramps                Severe chest pain                Black, tarry stools    If you have any questions or problems, please call your Physician:    Campbell Norman MD     Lab Results: [phone]    If a complication or emergency situation arises and you are unable to reach your Physician - GO TO THE EMERGENCY ROOM.

## 2017-09-12 NOTE — TRANSFER OF CARE
"Anesthesia Transfer of Care Note    Patient: Stephon Lock    Procedure(s) Performed: Procedure(s) (LRB):  ULTRASOUND-ENDOSCOPIC-UPPER W/FNA -confirmed with lab that previous cyst fluid for CEA level was never received (N/A)    Patient location: GI    Anesthesia Type: MAC    Transport from OR: Transported from OR on room air with adequate spontaneous ventilation    Post pain: adequate analgesia    Post assessment: no apparent anesthetic complications    Post vital signs: stable    Level of consciousness: awake    Nausea/Vomiting: no nausea/vomiting    Complications: none          Last vitals:   Visit Vitals  BP (!) 158/85 (BP Location: Right arm, Patient Position: Lying)   Pulse 68   Temp 37.2 °C (98.9 °F) (Oral)   Resp 20   Ht 5' 6" (1.676 m)   Wt 106.6 kg (235 lb)   SpO2 98%   BMI 37.93 kg/m²     "

## 2017-09-12 NOTE — H&P
Short Stay Endoscopy History and Physical    PCP - Jewell Gerard MD  Referring Physician - Campbell Norman MD  [address] [facility]  [address]  [place] [address]    Procedure - eus fna  ASA - per anesthesia  Mallampati - per anesthesia  History of Anesthesia problems - no  Family history Anesthesia problems -  no   Plan of anesthesia - General    HPI:  This is a 47 y.o. male here for evaluation of: pancreatic cyst    Reflux - no  Dysphagia - no  Abdominal pain - no  Diarrhea - no    ROS:  Constitutional: No fevers, chills, No weight loss  CV: No chest pain  Pulm: No cough, No shortness of breath  Ophtho: No vision changes  GI: see HPI  Derm: No rash    Medical History:  has a past medical history of Benign neoplasm of adrenal gland; CVA (cerebral vascular accident); Diabetes mellitus type II, uncontrolled; Hypertension associated with diabetes; and Pancreatitis.    Surgical History:  has no past surgical history on file.    Family History: family history includes Diabetes in his father; Hyperlipidemia in his father and mother; Hypertension in his mother..    Social History:  reports that he has never smoked. He has never used smokeless tobacco. He reports that he does not drink alcohol or use drugs.    Review of patient's allergies indicates:  No Known Allergies    Medications:   Prescriptions Prior to Admission   Medication Sig Dispense Refill Last Dose    amlodipine (NORVASC) 5 MG tablet 10 mg.    9/12/2017 at 5am    aspirin 81 MG Chew Take 81 mg by mouth once daily.   9/12/2017 at 5am    cyclobenzaprine (FLEXERIL) 10 MG tablet    9/12/2017 at 5am    ferrous sulfate 325 mg (65 mg iron) Tab tablet    9/12/2017 at 5am    insulin aspart (NOVOLOG) 100 unit/mL InPn pen Inject 20 Units into the skin.   9/11/2017 at 9pm    insulin glargine (LANTUS SOLOSTAR) 100 unit/mL (3 mL) InPn pen Inject 60 Units into the skin.   9/11/2017 at 7am    insulin needles, disposable, 31 Ndle 1 each by Misc.(Non-Drug; Combo  Route) route 3 (three) times daily. 100 each 11 9/11/2017 at Unknown time    metoprolol succinate (TOPROL-XL) 50 MG 24 hr tablet Take 50 mg by mouth once daily.   9/12/2017 at Unknown time    simvastatin (ZOCOR) 40 MG tablet    9/11/2017 at 9pm    ergocalciferol (ERGOCALCIFEROL) 50,000 unit Cap    Unknown at Unknown time    hydrochlorothiazide (HYDRODIURIL) 25 MG tablet Take 1 tablet (25 mg total) by mouth once daily. 30 tablet 11 Taking    ondansetron (ZOFRAN-ODT) 4 MG TbDL Take 4 mg by mouth every 6 (six) hours as needed.   Unknown at Unknown time       Physical Exam:    Vital Signs:   Vitals:    09/12/17 0723   BP: (!) 158/85   Pulse: 68   Resp: 20   Temp: 98.9 °F (37.2 °C)       General Appearance: Well appearing in no acute distress  Eyes:    No scleral icterus  ENT: Neck supple  Lungs: CTA anteriorly  Heart:  Regular rate  Abdomen: Soft, non tender, non distended with normal bowel sounds.  Extremities: No edema  Skin: No rash    Labs:  Lab Results   Component Value Date    WBC 14.89 (H) 01/09/2017    HGB 9.9 (L) 01/09/2017    HCT 29.5 (L) 01/09/2017     01/09/2017    CHOL 256 (H) 04/03/2014    CHOL 256 (H) 04/03/2014    TRIG 170 (H) 12/31/2016    HDL 31 (L) 04/03/2014    HDL 31 (L) 04/03/2014    ALT 50 (H) 08/10/2017    AST 22 08/10/2017     (L) 08/10/2017    K 4.8 08/10/2017     08/10/2017    CREATININE 2.9 (H) 08/10/2017    BUN 48 (H) 08/10/2017    CO2 24 08/10/2017    TSH 1.110 04/03/2014    INR 1.0 12/29/2016    HGBA1C 11.4 (H) 12/29/2016       I have explained the risks and benefits of this endoscopic procedure to the patient including but not limited to bleeding, inflammation, infection, perforation, and death.      Campbell Norman MD

## 2017-09-12 NOTE — TELEPHONE ENCOUNTER
----- Message from Campbell Norman MD sent at 9/12/2017  9:06 AM CDT -----  Radha,  This gentleman has a large pancreatic walled off pancreatic collection on EUS.I've ordered a CT scan and referred him back to our [facility] clinic next week.    He needs the scan before clinic to discuss an endoscopic necrosectomy which we'll probably do at [facility]    Thanks

## 2017-09-14 ENCOUNTER — TELEPHONE (OUTPATIENT)
Dept: GASTROENTEROLOGY | Facility: CLINIC | Age: 47
End: 2017-09-14

## 2017-09-14 VITALS
HEIGHT: 66 IN | DIASTOLIC BLOOD PRESSURE: 76 MMHG | RESPIRATION RATE: 18 BRPM | TEMPERATURE: 99 F | SYSTOLIC BLOOD PRESSURE: 160 MMHG | OXYGEN SATURATION: 98 % | BODY MASS INDEX: 37.77 KG/M2 | HEART RATE: 60 BPM | WEIGHT: 235 LBS

## 2017-09-14 DIAGNOSIS — K85.90 PANCREATITIS, UNSPECIFIED PANCREATITIS TYPE: Primary | ICD-10-CM

## 2017-09-14 DIAGNOSIS — K86.89 NECROSIS OF PANCREAS: ICD-10-CM

## 2017-09-14 NOTE — TELEPHONE ENCOUNTER
His necroma appears to be getting smaller without an intervention.  I called him today to discuss these results .      We have concluded that we will repeat a CT scan in @3 months to assess the size of the collection and discuss the findings in clinic.  If his collection is smaller we'll continue to monitor. If it remains the same size we may then decide to intervene surgically (as his preference) is or endoscopically

## 2017-09-14 NOTE — TELEPHONE ENCOUNTER
LM for pt to return call, recall placed for a 3 month f/u with ct scan to be performed in AM and clinic visit in PM same day

## 2017-09-14 NOTE — TELEPHONE ENCOUNTER
----- Message from Mildred Marcial sent at 9/14/2017  3:47 PM CDT -----  Contact: Carole/[phone]  Carole would like to speak with you about getting a copy of CT and blood work. Please advise.

## 2017-10-18 ENCOUNTER — TELEPHONE (OUTPATIENT)
Dept: GASTROENTEROLOGY | Facility: CLINIC | Age: 47
End: 2017-10-18

## 2017-10-18 NOTE — TELEPHONE ENCOUNTER
----- Message from Aida Pratt sent at 10/18/2017 10:31 AM CDT -----  Contact: [phone]/pt's mother  Patient requesting to speak with you regarding scheduling appointment and Ct scan. Please advise.

## 2017-11-22 ENCOUNTER — TELEPHONE (OUTPATIENT)
Dept: GASTROENTEROLOGY | Facility: CLINIC | Age: 47
End: 2017-11-22

## 2017-11-22 NOTE — TELEPHONE ENCOUNTER
Informed pts mother that we will give her a call back Monday to schedule pts CT Scan and OV with Dr. Nroman. Verbal Understanding.

## 2017-11-22 NOTE — TELEPHONE ENCOUNTER
----- Message from Ana Esparza sent at 11/22/2017  9:51 AM CST -----  Contact: Self [phone]  Patient is calling to get orders for a ct-scan and an appointment. Please advice

## 2017-11-29 ENCOUNTER — HOSPITAL ENCOUNTER (OUTPATIENT)
Dept: RADIOLOGY | Facility: HOSPITAL | Age: 47
Discharge: HOME | End: 2017-11-29
Attending: INTERNAL MEDICINE
Payer: MEDICARE

## 2017-11-29 DIAGNOSIS — K86.89 NECROSIS OF PANCREAS: ICD-10-CM

## 2017-11-29 DIAGNOSIS — K85.90 PANCREATITIS, UNSPECIFIED PANCREATITIS TYPE: ICD-10-CM

## 2017-11-29 PROCEDURE — 74176 CT ABD & PELVIS W/O CONTRAST: CPT | Mod: 26,,, | Performed by: RADIOLOGY

## 2017-11-29 PROCEDURE — 74176 CT ABD & PELVIS W/O CONTRAST: CPT | Mod: TC

## 2017-11-30 NOTE — PROGRESS NOTES
The cyst continues to decrease in size without intervention.  He can follow up in clinic in another 3 months

## 2018-02-06 ENCOUNTER — TELEPHONE (OUTPATIENT)
Dept: GASTROENTEROLOGY | Facility: CLINIC | Age: 48
End: 2018-02-06

## 2018-02-06 NOTE — TELEPHONE ENCOUNTER
----- Message from Amalia Rod sent at 2/6/2018  9:58 AM CST -----  Contact: self, [phone]  2nd attempt requesting to schedule his 3 months follow up appointment. Please advise.

## 2018-03-01 ENCOUNTER — OFFICE VISIT (OUTPATIENT)
Dept: GASTROENTEROLOGY | Facility: CLINIC | Age: 48
End: 2018-03-01
Payer: MEDICARE

## 2018-03-01 VITALS
BODY MASS INDEX: 37.04 KG/M2 | WEIGHT: 229.5 LBS | SYSTOLIC BLOOD PRESSURE: 179 MMHG | HEART RATE: 58 BPM | DIASTOLIC BLOOD PRESSURE: 88 MMHG

## 2018-03-01 DIAGNOSIS — K85.92 ACUTE PANCREATITIS WITH INFECTED NECROSIS, UNSPECIFIED PANCREATITIS TYPE: ICD-10-CM

## 2018-03-01 DIAGNOSIS — K86.89 NECROSIS OF PANCREAS: Primary | ICD-10-CM

## 2018-03-01 PROCEDURE — 99214 OFFICE O/P EST MOD 30 MIN: CPT | Mod: S$GLB,,, | Performed by: INTERNAL MEDICINE

## 2018-03-01 PROCEDURE — 99999 PR PBB SHADOW E&M-EST. PATIENT-LVL II: CPT | Mod: PBBFAC,,,

## 2018-03-01 PROCEDURE — 3079F DIAST BP 80-89 MM HG: CPT | Mod: S$GLB,,, | Performed by: INTERNAL MEDICINE

## 2018-03-01 PROCEDURE — 3077F SYST BP >= 140 MM HG: CPT | Mod: S$GLB,,, | Performed by: INTERNAL MEDICINE

## 2018-03-01 RX ORDER — DULOXETIN HYDROCHLORIDE 60 MG/1
60 CAPSULE, DELAYED RELEASE ORAL 2 TIMES DAILY
COMMUNITY

## 2018-03-01 NOTE — PROGRESS NOTES
Subjective:       Patient ID: Stephon Lock is a 48 y.o. male.    Chief Complaint: Follow-up (pancreatitis)    I had seen Mr Azul last Fall in the Endo unit for follow up of an infected pancreatic necroma.  The collection has improved significantly and hes doing well.  It has slowly decreased in size.  He does not have pain or diarrhea.  He has issues related to glucose control and hypertension both of which he is working on with his PCP.     He had a colonoscopy by his gastroenterologist in [place].       Review of Systems   Constitutional: Positive for fatigue.   All other systems reviewed and are negative.      Objective:      Physical Exam   Constitutional: He appears well-developed and well-nourished.   HENT:   Head: Normocephalic and atraumatic.   Pulmonary/Chest: Effort normal.   Abdominal: Soft.   Psychiatric: He has a normal mood and affect.       Assessment:       1. Necrosis of pancreas    2. Acute pancreatitis with infected necrosis, unspecified pancreatitis type        Plan:       Mr Azul's necroma is slowly resolving and he is no longer symptomatic from it.  His glucose is being managed by his primary care physician. As he is no longer symptomatic and has been stable for 6 months I feel comfortable followin im u p in a year.  Sooner if his PCP or local GI doc feels liek he needs to be seen sooner    I spent approximately 25 minutes with the patient over 50% of it counseling about his current medical conditions.

## 2018-07-12 ENCOUNTER — OFFICE VISIT (OUTPATIENT)
Dept: ENDOCRINOLOGY | Facility: CLINIC | Age: 48
End: 2018-07-12
Payer: MEDICARE

## 2018-07-12 VITALS
BODY MASS INDEX: 37.63 KG/M2 | SYSTOLIC BLOOD PRESSURE: 130 MMHG | DIASTOLIC BLOOD PRESSURE: 80 MMHG | HEART RATE: 67 BPM | WEIGHT: 234.13 LBS | HEIGHT: 66 IN

## 2018-07-12 DIAGNOSIS — Z79.4 TYPE 2 DIABETES MELLITUS WITH DIABETIC NEUROPATHY, WITH LONG-TERM CURRENT USE OF INSULIN: ICD-10-CM

## 2018-07-12 DIAGNOSIS — E11.40 TYPE 2 DIABETES MELLITUS WITH DIABETIC NEUROPATHY, WITH LONG-TERM CURRENT USE OF INSULIN: ICD-10-CM

## 2018-07-12 DIAGNOSIS — E11.22 TYPE 2 DIABETES MELLITUS WITH STAGE 3 CHRONIC KIDNEY DISEASE AND HYPERTENSION: Primary | ICD-10-CM

## 2018-07-12 DIAGNOSIS — N18.30 TYPE 2 DIABETES MELLITUS WITH STAGE 3 CHRONIC KIDNEY DISEASE AND HYPERTENSION: Primary | ICD-10-CM

## 2018-07-12 DIAGNOSIS — I10 ESSENTIAL HYPERTENSION: ICD-10-CM

## 2018-07-12 DIAGNOSIS — Z86.73 HISTORY OF STROKE: ICD-10-CM

## 2018-07-12 DIAGNOSIS — I12.9 TYPE 2 DIABETES MELLITUS WITH STAGE 3 CHRONIC KIDNEY DISEASE AND HYPERTENSION: Primary | ICD-10-CM

## 2018-07-12 DIAGNOSIS — Z87.19 HISTORY OF PANCREATITIS: ICD-10-CM

## 2018-07-12 DIAGNOSIS — E78.5 HYPERLIPIDEMIA, UNSPECIFIED HYPERLIPIDEMIA TYPE: ICD-10-CM

## 2018-07-12 PROCEDURE — 3008F BODY MASS INDEX DOCD: CPT | Mod: CPTII,S$GLB,, | Performed by: NURSE PRACTITIONER

## 2018-07-12 PROCEDURE — 99204 OFFICE O/P NEW MOD 45 MIN: CPT | Mod: S$GLB,,, | Performed by: NURSE PRACTITIONER

## 2018-07-12 PROCEDURE — 3079F DIAST BP 80-89 MM HG: CPT | Mod: CPTII,S$GLB,, | Performed by: NURSE PRACTITIONER

## 2018-07-12 PROCEDURE — 99999 PR PBB SHADOW E&M-EST. PATIENT-LVL V: CPT | Mod: PBBFAC,,, | Performed by: NURSE PRACTITIONER

## 2018-07-12 PROCEDURE — 3075F SYST BP GE 130 - 139MM HG: CPT | Mod: CPTII,S$GLB,, | Performed by: NURSE PRACTITIONER

## 2018-07-12 RX ORDER — INSULIN DEGLUDEC 200 U/ML
70 INJECTION, SOLUTION SUBCUTANEOUS DAILY
Qty: 18 ML | Refills: 6 | Status: SHIPPED | OUTPATIENT
Start: 2018-07-12 | End: 2018-08-09 | Stop reason: SDUPTHER

## 2018-07-12 RX ORDER — LANCETS 33 GAUGE
1 EACH MISCELLANEOUS 4 TIMES DAILY
COMMUNITY

## 2018-07-12 RX ORDER — NIFEDIPINE 60 MG/1
30 TABLET, EXTENDED RELEASE ORAL DAILY
COMMUNITY

## 2018-07-12 RX ORDER — POTASSIUM CHLORIDE 1.5 G/1.58G
20 POWDER, FOR SOLUTION ORAL DAILY
COMMUNITY

## 2018-07-12 RX ORDER — VALSARTAN 160 MG/1
160 TABLET ORAL DAILY
COMMUNITY

## 2018-07-12 RX ORDER — BACLOFEN 10 MG/1
10 TABLET ORAL 2 TIMES DAILY
COMMUNITY

## 2018-07-12 RX ORDER — CALCITRIOL 0.25 UG/1
0.25 CAPSULE ORAL DAILY
COMMUNITY

## 2018-07-12 RX ORDER — MINOXIDIL 2.5 MG/1
2.5 TABLET ORAL DAILY
COMMUNITY

## 2018-07-12 RX ORDER — VERAPAMIL HYDROCHLORIDE 120 MG/1
120 CAPSULE, EXTENDED RELEASE ORAL DAILY
COMMUNITY

## 2018-07-12 RX ORDER — COLCHICINE 0.6 MG/1
0.6 TABLET ORAL DAILY
COMMUNITY

## 2018-07-12 NOTE — LETTER
July 13, 2018      Skinny Herrera MD  [address] [place] [address]           [facility]  [address] [facility] [address] [place] [address]  Phone: [phone]  Fax: [phone]          Patient: Stephon Lock   MR Number: [id number]   YOB: 1970   Date of Visit: 7/12/2018       Dear Dr. Skinny Herrera:    Thank you for referring Stephon Lock to me for evaluation. Attached you will find relevant portions of my assessment and plan of care.    If you have questions, please do not hesitate to call me. I look forward to following Stephon Lock along with you.    Sincerely,    JONATHON Andrews,FNP-C    Enclosure  CC:  No Recipients    If you would like to receive this communication electronically, please contact [email or web address] or [phone] to request more information on [employer] access.    For providers and/or their staff who would like to refer a patient to [facility], please contact us through our one-stop-shop provider referral line, [facility] , at [phone].    If you feel you have received this communication in error or would no longer like to receive these types of communications, please e-mail [email or web address]

## 2018-07-12 NOTE — PROGRESS NOTES
CC: Mr. Stephon Lock arrives today for management of Type 2 DM and review of chronic medical conditions, as listed in the Visit Diagnosis section of this encounter.       HPI: Mr. Stephon Lock was diagnosed with Type 2 DM in his mid 20s. He was diagnosed based on lab work. Initial treatment consisted of orals and insulin was added approximately 4 years later. + FH of DM in half brother. He reports past hospitalization for DKA during the time he had pancreatitis in 2016. He previously followed with endocrinologist in [place]. DM is complicated by CVD in 2014, CKD, and neuropathy.     This is his first time being seen in endocrine at [facility].     He states that he had labs drawn for his PCP this week.  He thinks his A1c was near 11%.     He follows with nephrology, Dr. Herrera, for CKD.      BG readings are checked 3x/day.            Hypoglycemia: May occur when he is active outside. Reports recent episode of 58.  Hypoglycemic Symptoms: sweating and double vision  Hypoglycemia Treatment: Charlene bar or glucose gel    Missing Insulin/PO medication doses: No  Timing prandial insulin 5-15 minutes before meals: yes    Exercise: Yes, active around the house and yard. Was walking 30 minutes most days of the week but his leg brace broke. He has new brace and plans to resume.     Dietary Habits: Eats 3 meals/day. Snacks after dinner on chips. Avoids sugary beverages. .    Last DM education appointment: 2016    He is interested in the Freestyle Krystle CGMS.    CURRENT DIABETIC MEDS: Lantus 31 units BID, Novolog 12 units AC + SSI (unsure of type)  Vial or pen: pens  Glucometer type: True Metrix    Previous DM treatments:  Metformin - discontinued due to renal status    Last Eye Exam: summer 2017. Reports macular degeneration. Plans to reschedule.   Last Podiatry Exam: n/a    REVIEW OF SYSTEMS  Constitutional: no c/o fatigue, weakness, or weight loss.   Eyes: denies visual disturbances.  ENT: denies dysphagia, hearing  "loss  Cardiac: no palpitations or chest pain.  Respiratory: no cough or dyspnea.  GI: no c/o abdominal pain or nausea. + h/o pancreatitis.   : denies urinary frequency, burning, discharge, frequent UTIs  Skin: no lesions or rashes. Reports he had L great toenail removed after toe was rolled over with tractor tire.   Musculoskeletal: denies difficulty mobilizing, denies muscle or joint pains  Neuro: + numbness, tingling to his L side since stroke.   Endocrine: denies polyphagia, polydipsia, polyuria      Personally reviewed Past Medical, Surgical, Social History.    Vital Signs  /80   Pulse 67   Ht 5' 6" (1.676 m)   Wt 106.2 kg (234 lb 2.1 oz)   BMI 37.79 kg/m²     Personally reviewed the below labs:    Hemoglobin A1C   Date Value Ref Range Status   12/29/2016 11.4 (H) 4.5 - 6.2 % Final     Comment:     According to ADA guidelines, hemoglobin A1C <7.0% represents  optimal control in non-pregnant diabetic patients.  Different  metrics may apply to specific populations.   Standards of Medical Care in Diabetes - 2016.  For the purpose of screening for the presence of diabetes:  <5.7%     Consistent with the absence of diabetes  5.7-6.4%  Consistent with increasing risk for diabetes   (prediabetes)  >or=6.5%  Consistent with diabetes  Currently no consensus exists for use of hemoglobin A1C  for diagnosis of diabetes for children.     04/03/2014 11.4 (H) 4.5 - 6.2 % Final   02/25/2013 13.4 (H) 4.0 - 6.2 % Final       Chemistry        Component Value Date/Time     (L) 08/10/2017 1543    K 4.8 08/10/2017 1543     08/10/2017 1543    CO2 24 08/10/2017 1543    BUN 48 (H) 08/10/2017 1543    CREATININE 2.9 (H) 08/10/2017 1543     (H) 08/10/2017 1543        Component Value Date/Time    CALCIUM 9.2 08/10/2017 1543    ALKPHOS 159 (H) 08/10/2017 1543    AST 22 08/10/2017 1543    ALT 50 (H) 08/10/2017 1543    BILITOT 0.2 08/10/2017 1543    ESTGFRAFRICA 28 (A) 08/10/2017 1543    EGFRNONAA 24 (A) " 08/10/2017 1543          Lab Results   Component Value Date    CHOL 256 (H) 04/03/2014    CHOL 256 (H) 04/03/2014    CHOL 287 (H) 02/25/2013     Lab Results   Component Value Date    HDL 31 (L) 04/03/2014    HDL 31 (L) 04/03/2014    HDL 31 (L) 02/25/2013     Lab Results   Component Value Date    LDLCALC Invalid, Trig>400.0 04/03/2014    LDLCALC Invalid, Trig>400.0 04/03/2014    LDLCALC UNABLE TO CALCULATE 02/25/2013     Lab Results   Component Value Date    TRIG 170 (H) 12/31/2016    TRIG 541 (H) 04/03/2014    TRIG 541 (H) 04/03/2014     Lab Results   Component Value Date    CHOLHDL 12.1 (L) 04/03/2014    CHOLHDL 12.1 (L) 04/03/2014    CHOLHDL 10.8 (L) 02/25/2013       Lab Results   Component Value Date    MICALBCREAT 2,330.3 (H) 04/03/2014     Lab Results   Component Value Date    TSH 1.110 04/03/2014       CrCl cannot be calculated (Patient's most recent lab result is older than the maximum 7 days allowed.).    Vit D, 25-Hydroxy   Date Value Ref Range Status   01/04/2017 17 (L) 30 - 96 ng/mL Final     Comment:     Vitamin D deficiency.........<10 ng/mL                              Vitamin D insufficiency......10-29 ng/mL       Vitamin D sufficiency........> or equal to 30 ng/mL  Vitamin D toxicity............>100 ng/mL           PHYSICAL EXAMINATION  Constitutional: Appears well, no distress  Neck: Supple, trachea midline; no thyromegaly or nodules.   Respiratory: CTA, even and unlabored.  Cardiovascular: RRR, no murmurs, no carotid bruits. DP pulses  2+ bilaterally; no edema.    GI: bowel sounds active, no hernia noted  Lymph: no cervical or supraclavicular lymphadenopathy  Skin: warm and dry; no lipohypertrophy, or acanthosis nigracans observed.  Psych: normal affect, pleasant mood, conversant  Neuro: no loss of protective sensation via 10 gm monofilament. Vibratory exam decreased, bilaterally, DTR 1+ BUE/2+BLE.  Feet: no open wounds or callouses; appropriate footwear. L great toenail absent. No drainage or  odor.       A1c target < 7.5%      Assessment/Plan  1. Type 2 diabetes mellitus with stage 3 chronic kidney disease and hypertension  -- Uncontrolled. We will call his PCP's office to obtain recent labs. On high basal insulin dose. Appears to need adjustment of both insulins.   -- change Lantus to Tresiba U200  70 units QAM only. Hold Lantus the night before starting.   -- increase Novolog to 16 units AC. Start correction scale, target 180, ISF 25  Ambulatory Referral to Diabetes Education for diet. Needs to stop snacking at night and keep carbs consistent at meal time.   -- check BG 4x/day. Information provided regarding Freestyle Krystle sensor. He will contact [employer] and have them fax us the order form.   -- discussed exercise precautions. If BG < 120 before strenuous activity, have 15g carb snack. Carry glucose gel/tabs.   -- will check C-peptide in the future    -- Discussed diagnosis of DM, A1c goals, progression of disease, long term complications and tx options.    -- Reviewed hypoglycemia management: treat with 1/2 glass of juice, 1/2 can regular coke, or 4 glucose tablets. Monitor and repeat treatment every 15 minutes until BG is >70 Then have a snack, which includes a complex carbohydrate and protein.   Advised patient to check BG before activities, such as driving or exercise.    -- taking aspirin, statin, ARB   2. Type 2 diabetes mellitus with diabetic neuropathy, with long-term current use of insulin  -- discussed the importance of daily inspection of bottoms of both feet, interspace areas. Advised patient against walking barefoot.     3. History of stroke  -- avoid hypoglycemia.   4. Essential hypertension  -- controlled. Continue current meds.  -- defer med management to nephrology.   5. Hyperlipidemia, unspecified hyperlipidemia type  -- continue statin  -- will obtain lab work.    6. History of pancreatitis  -- avoid GLP-1RA, DPP4-i        FOLLOW UP  Follow-up in about 4 weeks (around 8/9/2018).    Patient instructed to bring BG logs to each follow up.   Patient encouraged to call for any BG/medication issues, concerns, or questions.    Orders Placed This Encounter   Procedures    Hemoglobin A1c    C-peptide    Comprehensive metabolic panel    Ambulatory Referral to Diabetes Education       ADDENDUM: received labs from PCP    A1c - 13.8%  Potassium - 3.8  Sodium - 134  Glucose - 256  Calcium - 9.5  Creatinine - 3.64  GFR - 18    Cholesterol - 324  Triglycerides - 707  LDL - unable to calculate

## 2018-07-12 NOTE — PATIENT INSTRUCTIONS
Hypoglycemia (Low Blood Sugar)     Fast-acting sugar includes a cup of nonfat milk.     Too little sugar (glucose) in your blood is called hypoglycemia or low blood sugar. Low blood sugar usually means anything lower than 70 mg/dL. Talk with your healthcare provider about your target range and what level is too low for you. Diabetes itself doesnt cause low blood sugar. But some of the treatments for diabetes, such as pills or insulin, may raise your risk for it. Low blood sugar may cause you to pass out or have a seizure. So always treat low blood sugar right away, but don't overeat.  Special note: Always carry a source of fast-acting sugar and a snack in case of hypoglycemia.   What you may notice  If you have low blood sugar, you may have one or more of these symptoms:  · Shakiness or dizziness  · Cold, clammy skin or sweating  · Feelings of hunger  · Headache  · Nervousness  · A hard, fast heartbeat  · Weakness  · Confusion or irritability  · Blurred vision  · Having nightmares or waking up confused or sweating  · Numbness or tingling in the lips or tongue  What you should do  Here are tips to follow if you have hypoglycemia:   · First check your blood sugar. If it is too low (out of your target range), eat or drink 15 to 20 grams of fast-acting sugar. This may be 3 to 4 glucose tablets, 4 ounces (half a cup) of fruit juice or regular (nondiet) soda, 8 ounces (1 cup) of fat-free milk, or 1 tablespoon of honey. Dont take more than this, or your blood sugar may go too high.  · Wait 15 minutes. Then recheck your blood sugar if you can.  · If your blood sugar is still too low, repeat the steps above and check your blood sugar again. If your blood sugar still has not returned to your target range, contact your healthcare provider or seek emergency care.  · Once your blood sugar returns to target range, eat a snack or meal.  Preventing low blood sugar  Things you can do include the following:   · If your condition  needs a strict treatment plan, eat your meals and snacks at the same times each day. Dont skip meals!  · If your treatment plan lets you change when you eat and what you eat, learn how to change the time and dose of your rapid-acting insulin to match this.   · Ask your healthcare provider if it is safe for you to drink alcohol. Never drink on an empty stomach.  · Take your medicine at the prescribed times.  · Always carry a source of fast-acting sugar and a snack when youre away from home.  Other things to do  Additional tips include the following:  · Carry a medical ID card, a compact USB drive, or wear a medical alert bracelet or necklace. It should say that you have diabetes. It should also say what to do if you pass out or have a seizure.  · Make sure your family, friends, and coworkers know the signs of low blood sugar. Tell them what to do if your blood sugar falls very low and you cant treat yourself.  · Keep a glucagon emergency kit handy. Be sure your family, friends, and coworkers know how and when to use it. Check it regularly and replace the glucagon before it expires.  · Talk with your health care team about other things you can do to prevent low blood sugar.     If you have unexplained hypoglycemia or hypoglycemia several times, call your healthcare provider.   Date Last Reviewed: 5/1/2016  © 6283-1586 [employer]. [address] [place] [address], [place] [address]. All rights reserved. This information is not intended as a substitute for professional medical care. Always follow your healthcare professional's instructions.

## 2018-08-09 ENCOUNTER — CLINICAL SUPPORT (OUTPATIENT)
Dept: DIABETES | Facility: CLINIC | Age: 48
End: 2018-08-09
Payer: MEDICARE

## 2018-08-09 ENCOUNTER — OFFICE VISIT (OUTPATIENT)
Dept: ENDOCRINOLOGY | Facility: CLINIC | Age: 48
End: 2018-08-09
Payer: MEDICARE

## 2018-08-09 VITALS — BODY MASS INDEX: 38.23 KG/M2 | HEIGHT: 66 IN | WEIGHT: 237.88 LBS

## 2018-08-09 VITALS
BODY MASS INDEX: 38.23 KG/M2 | SYSTOLIC BLOOD PRESSURE: 104 MMHG | HEART RATE: 84 BPM | DIASTOLIC BLOOD PRESSURE: 80 MMHG | WEIGHT: 237.88 LBS | HEIGHT: 66 IN

## 2018-08-09 DIAGNOSIS — E11.40 TYPE 2 DIABETES MELLITUS WITH DIABETIC NEUROPATHY, WITH LONG-TERM CURRENT USE OF INSULIN: ICD-10-CM

## 2018-08-09 DIAGNOSIS — I10 ESSENTIAL HYPERTENSION: ICD-10-CM

## 2018-08-09 DIAGNOSIS — Z87.19 HISTORY OF PANCREATITIS: ICD-10-CM

## 2018-08-09 DIAGNOSIS — I12.9 TYPE 2 DIABETES MELLITUS WITH STAGE 3 CHRONIC KIDNEY DISEASE AND HYPERTENSION: Primary | ICD-10-CM

## 2018-08-09 DIAGNOSIS — E11.22 TYPE 2 DIABETES MELLITUS WITH STAGE 3 CHRONIC KIDNEY DISEASE AND HYPERTENSION: Primary | ICD-10-CM

## 2018-08-09 DIAGNOSIS — Z86.73 HISTORY OF STROKE: ICD-10-CM

## 2018-08-09 DIAGNOSIS — E11.649 HYPOGLYCEMIA DUE TO TYPE 2 DIABETES MELLITUS: ICD-10-CM

## 2018-08-09 DIAGNOSIS — E11.40 TYPE 2 DIABETES MELLITUS WITH DIABETIC NEUROPATHY, WITH LONG-TERM CURRENT USE OF INSULIN: Primary | ICD-10-CM

## 2018-08-09 DIAGNOSIS — Z79.4 TYPE 2 DIABETES MELLITUS WITH DIABETIC NEUROPATHY, WITH LONG-TERM CURRENT USE OF INSULIN: Primary | ICD-10-CM

## 2018-08-09 DIAGNOSIS — Z79.4 TYPE 2 DIABETES MELLITUS WITH DIABETIC NEUROPATHY, WITH LONG-TERM CURRENT USE OF INSULIN: ICD-10-CM

## 2018-08-09 DIAGNOSIS — E78.5 HYPERLIPIDEMIA, UNSPECIFIED HYPERLIPIDEMIA TYPE: ICD-10-CM

## 2018-08-09 DIAGNOSIS — Z51.81 MEDICATION MONITORING ENCOUNTER: ICD-10-CM

## 2018-08-09 DIAGNOSIS — N18.30 TYPE 2 DIABETES MELLITUS WITH STAGE 3 CHRONIC KIDNEY DISEASE AND HYPERTENSION: Primary | ICD-10-CM

## 2018-08-09 PROCEDURE — 99999 PR PBB SHADOW E&M-EST. PATIENT-LVL I: CPT | Mod: PBBFAC,,,

## 2018-08-09 PROCEDURE — 3079F DIAST BP 80-89 MM HG: CPT | Mod: CPTII,S$GLB,, | Performed by: NURSE PRACTITIONER

## 2018-08-09 PROCEDURE — 3008F BODY MASS INDEX DOCD: CPT | Mod: CPTII,S$GLB,, | Performed by: NURSE PRACTITIONER

## 2018-08-09 PROCEDURE — 3074F SYST BP LT 130 MM HG: CPT | Mod: CPTII,S$GLB,, | Performed by: NURSE PRACTITIONER

## 2018-08-09 PROCEDURE — 99214 OFFICE O/P EST MOD 30 MIN: CPT | Mod: S$GLB,,, | Performed by: NURSE PRACTITIONER

## 2018-08-09 PROCEDURE — 99999 PR PBB SHADOW E&M-EST. PATIENT-LVL V: CPT | Mod: PBBFAC,,, | Performed by: NURSE PRACTITIONER

## 2018-08-09 PROCEDURE — G0108 DIAB MANAGE TRN  PER INDIV: HCPCS | Mod: S$GLB,,, | Performed by: DIETITIAN, REGISTERED

## 2018-08-09 RX ORDER — INSULIN DEGLUDEC 200 U/ML
60 INJECTION, SOLUTION SUBCUTANEOUS DAILY
Qty: 18 ML | Refills: 6
Start: 2018-08-09 | End: 2018-10-08

## 2018-08-09 RX ORDER — FUROSEMIDE 20 MG/1
20 TABLET ORAL 2 TIMES DAILY
COMMUNITY

## 2018-08-09 NOTE — PATIENT INSTRUCTIONS
Hypoglycemia (Low Blood Sugar)     Fast-acting sugar includes a cup of nonfat milk.     Too little sugar (glucose) in your blood is called hypoglycemia or low blood sugar. Low blood sugar usually means anything lower than 70 mg/dL. Talk with your healthcare provider about your target range and what level is too low for you. Diabetes itself doesnt cause low blood sugar. But some of the treatments for diabetes, such as pills or insulin, may raise your risk for it. Low blood sugar may cause you to pass out or have a seizure. So always treat low blood sugar right away, but don't overeat.  Special note: Always carry a source of fast-acting sugar and a snack in case of hypoglycemia.   What you may notice  If you have low blood sugar, you may have one or more of these symptoms:  · Shakiness or dizziness  · Cold, clammy skin or sweating  · Feelings of hunger  · Headache  · Nervousness  · A hard, fast heartbeat  · Weakness  · Confusion or irritability  · Blurred vision  · Having nightmares or waking up confused or sweating  · Numbness or tingling in the lips or tongue  What you should do  Here are tips to follow if you have hypoglycemia:   · First check your blood sugar. If it is too low (out of your target range), eat or drink 15 to 20 grams of fast-acting sugar. This may be 3 to 4 glucose tablets, 4 ounces (half a cup) of fruit juice or regular (nondiet) soda, 8 ounces (1 cup) of fat-free milk, or 1 tablespoon of honey. Dont take more than this, or your blood sugar may go too high.  · Wait 15 minutes. Then recheck your blood sugar if you can.  · If your blood sugar is still too low, repeat the steps above and check your blood sugar again. If your blood sugar still has not returned to your target range, contact your healthcare provider or seek emergency care.  · Once your blood sugar returns to target range, eat a snack or meal.  Preventing low blood sugar  Things you can do include the following:   · If your condition  needs a strict treatment plan, eat your meals and snacks at the same times each day. Dont skip meals!  · If your treatment plan lets you change when you eat and what you eat, learn how to change the time and dose of your rapid-acting insulin to match this.   · Ask your healthcare provider if it is safe for you to drink alcohol. Never drink on an empty stomach.  · Take your medicine at the prescribed times.  · Always carry a source of fast-acting sugar and a snack when youre away from home.  Other things to do  Additional tips include the following:  · Carry a medical ID card, a compact USB drive, or wear a medical alert bracelet or necklace. It should say that you have diabetes. It should also say what to do if you pass out or have a seizure.  · Make sure your family, friends, and coworkers know the signs of low blood sugar. Tell them what to do if your blood sugar falls very low and you cant treat yourself.  · Keep a glucagon emergency kit handy. Be sure your family, friends, and coworkers know how and when to use it. Check it regularly and replace the glucagon before it expires.  · Talk with your health care team about other things you can do to prevent low blood sugar.     If you have unexplained hypoglycemia or hypoglycemia several times, call your healthcare provider.   Date Last Reviewed: 5/1/2016  © 4341-7182 [employer]. [address] [place] [address], [place] [address]. All rights reserved. This information is not intended as a substitute for professional medical care. Always follow your healthcare professional's instructions.

## 2018-08-09 NOTE — PROGRESS NOTES
Diabetes Education  Author: Kelsey Berry RD  Date: 8/9/2018    Diabetes Education Visit  Diabetes Education Record Assessment/Progress: Initial    Patient here for diabetes education.  Most of visit spent discussing balanced eating-plate method.  Weight stable since last seen in clinic--personal goal to lose weight gradually, long term goal to weigh closer to 200 lbs.      Diabetes Type  Diabetes Type : Type II    Diabetes History  Diabetes Diagnosis: >10 years (Diagnosed in his 20s)    Nutrition  Meal Planning: water, 3 meals per day, diet drinks, snacks between meal  What type of sweetener do you use?: Splenda  What type of beverages do you drink?: water, diet soda/tea  Meal Plan 24 Hour Recall - Breakfast: 2 eggs, sausage, tortilla wrap, diet Coke  Meal Plan 24 Hour Recall - Lunch: At home: ham sandwich with mustart  Meal Plan 24 Hour Recall - Dinner: meat, green beans or green peas, rice gravy, tea with Splenda  Meal Plan 24 Hour Recall - Snack: potato chips, fun size candy bars    Monitoring   Monitoring: Other (True Metrix--reports machine is 1 1/2 years old)  Self Monitoring : Checks 3-4 times daily  Blood Glucose Logs: No (Self reports glucose has improved since starting Tresiba--glucose this  mg/dL)  Do you use a personal glucose monitor?: No (Interested in [employer]--however may be cost prohibitive as he states his glucose testing supplies are free)  In the last month, how often have you had a low blood sugar reaction?: once  What are your symptoms of low blood sugar?: shakiness, jittery  How do you treat low blood sugar?: uncertain--patient with a glucose of 55 mg/dL after attending a cake/ice cream social  Can you tell when your blood sugar is too high?: no    Exercise   Exercise Type: none    Current Diabetes Treatment   Current Treatment: Insulin (Tresiba U200: 70 units QAM, Novolog 16 units AC plus correction (Target 180, ISF 25)    Social History  Preferred Learning Method:  "Face to Face  Primary Support: Self, Family (Mother is here with patient today for visit)  Smoking Status: Never a Smoker  Alcohol Use: Never    Barriers to Change  Barriers to Change: None  Learning Challenges : Below Level for Age/Development    Readiness to Learn   Readiness to Learn : Acceptance    Cultural Influences  Cultural Influences: None    Diabetes Education Assessment/Progress    Diabetes Disease Process (diabetes disease process and treatment options): Discussion, Demonstrates Understanding/Competency(verbalizes/demonstrates).  Hgb A1c obtained reveals most recent results are 13.8%.  Discussed goal Hgb A1c of 7%.  Also discussed goal for pre prandial glucose readings as patient is compliant with home glucose monitoring.       Nutrition (Incorporating nutritional management into one's lifestyle): Discussion, Written Materials Provided, Demonstrates Understanding/Competency (verbalizes/demonstrates), Instructed.  Reviewed food sources of CHO.  Discussed which foods are CHO, which foods are protein, and which vegetables are considered non starchy (mother was surprised as she thought green peas were a "good vegetable").  Most of appointment spent explaining plate method (balanced eating).  Patient given a tri-fold pamphlet to help with meal planning and making better food choices at meal times.  Struggles with snacking--discussed options that are low/no CHO for snacks such as peanut butter, cheese, and nuts/seeds.  Patient in agreement to decrease frequency of snacking--feels he is eating out of habit and not due to hunger. Practiced meal planning with foods typically eaten.  Patient was provided with a CHO budget for meals and snacks and practiced reading food labels for CHO content, however I feel that a more simplistic recommendation of plate method eating may be easier for patient to understand at this time.      Physical Activity (incorporating physical activity into one's lifestyle): Discussion, " Comprehends Key Points.  Mother attend local rehab center and patient can attend with her. He reports at one time exercise was contraindicated due to high BP but that he has better control of his BP now.  Some limitations due to leg brace--difficulty walking long distances.  If he chooses to attend facility with mother, he would have acces to stationary bicycles, Nu-Step, and water exercises.  Goal to start aerobic exercise and work toward goal of 30 minutes at least 3 days a week both for better glucose control and for weight loss efforts.     Medications (states correct name, dose, onset, peak, duration, side effects & timing of meds): Discussion, Demonstrates Understanding/Competency(verbalizes/demonstrates).  Patient reports he is compliant with his diabetic medication at this time.      Monitoring (monitoring blood glucose/other parameters & using results): Discussion, Comprehends Key Points.  Did not bring glucose log to education visit--left in car.  Patient strongly encouraged to bring in completed glucose logs to Endo appt later today to determine if adjustments to current diabetic medication regimen warranted.      Acute Complications (preventing, detecting, and treating acute complications): Discussion, Demonstrates Understanding/Competency (verbalizes/demonstrates). Reviewed signs and symptoms of hypoglycemia (glucose < 70) and proper methods to treat hypoglycemic events if occur.  15-15 rule: patient to eat 15 gm simple, concentrated CHO (such as 4 glucose tablets, 4 oz fruit juice/regular soda, or 1 Tbsp honey/sugar) and wait 15 minutes and recheck home glucose.  Written information provided to patient.  Patient to call if more than 2 hypoglycemic events occur.      Chronic Complications (preventing, detecting, and treating chronic complications): Discussion, Demonstrates Understanding/Competency (verbalizes/demonstrates).  Discussed long term complications of uncontrolled diabetes.      Clinical  (diabetes, other pertinent medical history, and relevant comorbidities reviewed during visit): Discussion, Comprehends Key Points.  History of obesity, essential hypertension, hyperlipidemia, pancreatitis, stage 3 kidney disease    Behavioral (readiness for change, lifestyle practices, self-care behaviors): Discussion, Demonstrates Understanding/Competency (verbalizes/demonstrates): Patient willing to make dietary changes--balancing meals, decreasing portions of snacks.  Will work to follow plate method style of eating at meals.      Goals  Patient has selected/evaluated goals during today's session: Yes, selected  Healthy Eating: Set (Decrease high CHO snacks in between meals)  Start Date: 08/09/18  Target Date: 11/09/18  Physical Activity: Set (Stationary bicycle or water walking--20 minutes, 3 times a week)  Start Date: 08/09/18  Target Date: 11/09/18     Diabetes Care Plan/Intervention  Education Plan/Intervention:  Patient to bring glucose log to Endo appt later today (he left them in car for education visit).  Will initiate plate method at meals to better balance vegetables, protein, and CHO at meals.  Will consider initiating aerobic exercise 3X/week.    Diabetes Meal Plan  Calories: 2000  Carbohydrate Per Meal: 30-45g  Carbohydrate Per Snack : 15-20g    Education Units of Time   Time Spent: 45 min    Health Maintenance was reviewed today with patient. Discussed with patient importance of routine eye exams, foot exams/foot care, blood work (i.e.: A1c, microalbumin, and lipid), dental visits, yearly flu vaccine, and pneumonia vaccine as indicated by PCP. Patient verbalized understanding.     Health Maintenance Topics with due status: Not Due       Topic Last Completion Date    Pneumococcal PPSV23 (High Risk) 04/03/2014    High Dose Statin 07/12/2018     Health Maintenance Due   Topic Date Due    TETANUS VACCINE  01/25/1986    Eye Exam  03/03/2014    Pneumococcal PCV13 (High Risk) (1 - PCV13 Required)  04/03/2015    Foot Exam  04/03/2015    Hemoglobin A1c  06/29/2017    Lipid Panel  12/31/2017    Colonoscopy  01/25/2018    Influenza Vaccine  08/01/2018

## 2018-08-09 NOTE — PROGRESS NOTES
CC: Mr. Stephon Lock arrives today for management of Type 2 DM and review of chronic medical conditions, as listed in the Visit Diagnosis section of this encounter.       HPI: Mr. Stephon Lock was diagnosed with Type 2 DM in his mid 20s. He was diagnosed based on lab work. Initial treatment consisted of orals and insulin was added approximately 4 years later. + FH of DM in half brother. He reports past hospitalization for DKA during the time he had pancreatitis in 2016. He previously followed with endocrinologist in [place]. DM is complicated by CVD, CKD, and neuropathy.   He follows with nephrology, Dr. Herrera, for CKD.     Initially seen by me 4 weeks ago. At this time, Lantus was changed to Tresiba U200 and Novolog dose was increased.     He states that he just began Tresiba on 8/1.  He saw Diabetes Education today.     He presents for BG log review today.     He reports that he has had more fatigue this week. Denies dizziness. His nephrologist had taken him off of Lasix but patient resumed this recently on his own accord.      BG readings are checked 3x/day. Of note, 1st log listed reflects glucoses since starting Tresiba.                 Hypoglycemia: Reports 2 episodes in the 50s overnight after starting Tresiba.   Hypoglycemic Symptoms: sweating and jittery  Hypoglycemia Treatment: candy bar    Missing Insulin/PO medication doses: No  Timing prandial insulin 5-15 minutes before meals: yes    Exercise: No but plans to start with stationary bike.     Dietary Habits: Eats 3 meals/day. Snacks mid afternoon and after dinner on chips. Avoids sugary beverages.     Last DM education appointment: 8/2018 - diet modifications discussed.         CURRENT DIABETIC MEDS: Tresiba U200  70 units QAM, Novolog 16 units AC + SSI, target 180, ISF 25  Vial or pen: pens  Glucometer type: True Metrix    Previous DM treatments:  Metformin - discontinued due to renal status  Lantus    Last Eye Exam: summer 2017. Reports  "macular degeneration.   Last Podiatry Exam: n/a    REVIEW OF SYSTEMS  Constitutional: no c/o weight loss. + fatigue, weakness  Eyes: denies visual disturbances.  Cardiac: no palpitations or chest pain.  Respiratory: no cough or dyspnea.  GI: no c/o abdominal pain or nausea. + h/o pancreatitis. + constipation   Skin: no lesions or rashes.   Neuro: + numbness, tingling to his L side since stroke.   Endocrine: denies polyphagia, polydipsia, polyuria      Personally reviewed Past Medical, Surgical, Social History.    Vital Signs  /80   Pulse 84   Ht 5' 6" (1.676 m)   Wt 107.9 kg (237 lb 14 oz)   BMI 38.39 kg/m²     Personally reviewed the below labs:    July 2018 external labs:     A1c - 13.8%  Potassium - 3.8  Sodium - 134  Glucose - 256  Calcium - 9.5  Creatinine - 3.64  GFR - 18    Cholesterol - 324  Triglycerides - 707  LDL - unable to calculate    Hemoglobin A1C   Date Value Ref Range Status   12/29/2016 11.4 (H) 4.5 - 6.2 % Final     Comment:     According to ADA guidelines, hemoglobin A1C <7.0% represents  optimal control in non-pregnant diabetic patients.  Different  metrics may apply to specific populations.   Standards of Medical Care in Diabetes - 2016.  For the purpose of screening for the presence of diabetes:  <5.7%     Consistent with the absence of diabetes  5.7-6.4%  Consistent with increasing risk for diabetes   (prediabetes)  >or=6.5%  Consistent with diabetes  Currently no consensus exists for use of hemoglobin A1C  for diagnosis of diabetes for children.     04/03/2014 11.4 (H) 4.5 - 6.2 % Final   02/25/2013 13.4 (H) 4.0 - 6.2 % Final       Chemistry        Component Value Date/Time     (L) 08/10/2017 1543    K 4.8 08/10/2017 1543     08/10/2017 1543    CO2 24 08/10/2017 1543    BUN 48 (H) 08/10/2017 1543    CREATININE 2.9 (H) 08/10/2017 1543     (H) 08/10/2017 1543        Component Value Date/Time    CALCIUM 9.2 08/10/2017 1543    ALKPHOS 159 (H) 08/10/2017 1543    AST " 22 08/10/2017 1543    ALT 50 (H) 08/10/2017 1543    BILITOT 0.2 08/10/2017 1543    ESTGFRAFRICA 28 (A) 08/10/2017 1543    EGFRNONAA 24 (A) 08/10/2017 1543          Lab Results   Component Value Date    CHOL 256 (H) 04/03/2014    CHOL 256 (H) 04/03/2014    CHOL 287 (H) 02/25/2013     Lab Results   Component Value Date    HDL 31 (L) 04/03/2014    HDL 31 (L) 04/03/2014    HDL 31 (L) 02/25/2013     Lab Results   Component Value Date    LDLCALC Invalid, Trig>400.0 04/03/2014    LDLCALC Invalid, Trig>400.0 04/03/2014    LDLCALC UNABLE TO CALCULATE 02/25/2013     Lab Results   Component Value Date    TRIG 170 (H) 12/31/2016    TRIG 541 (H) 04/03/2014    TRIG 541 (H) 04/03/2014     Lab Results   Component Value Date    CHOLHDL 12.1 (L) 04/03/2014    CHOLHDL 12.1 (L) 04/03/2014    CHOLHDL 10.8 (L) 02/25/2013       Lab Results   Component Value Date    MICALBCREAT 2,330.3 (H) 04/03/2014     Lab Results   Component Value Date    TSH 1.110 04/03/2014       CrCl cannot be calculated (Patient's most recent lab result is older than the maximum 7 days allowed.).    Vit D, 25-Hydroxy   Date Value Ref Range Status   01/04/2017 17 (L) 30 - 96 ng/mL Final     Comment:     Vitamin D deficiency.........<10 ng/mL                              Vitamin D insufficiency......10-29 ng/mL       Vitamin D sufficiency........> or equal to 30 ng/mL  Vitamin D toxicity............>100 ng/mL               A1c target < 7.5%      Assessment/Plan  1. Type 2 diabetes mellitus with stage 3 chronic kidney disease and hypertension  -- Uncontrolled but glucoses trending down. Now having nocturnal hypoglycemia. Prandial excursions continue.   -- decrease Tresiba U200 to 60 units QAM only.   -- increase Novolog to 20 units AC. Continue correction scale, target 180, ISF 25  -- check BG 4x/day.  -- schedule eye exam    -- Discussed diagnosis of DM, A1c goals, progression of disease, long term complications and tx options.     -- taking aspirin, statin, ARB   2.  Type 2 diabetes mellitus with diabetic neuropathy, with long-term current use of insulin  -- discussed the importance of daily inspection of bottoms of both feet, interspace areas. Advised patient against walking barefoot.     3. History of stroke  -- avoid hypoglycemia.   4. Essential hypertension  -- hypotensive at beginning of visit with BP of 86/60. BP then 104/80 upon repeat.  -- I advised him to notify nephrology today  -- hold Lasix tonight if he can't get through to nephrology.    5. Hyperlipidemia, unspecified hyperlipidemia type  -- uncontrolled with elevated triglycerides.   -- Will consider adding fenofibrate if persistent after optimizing DM control.  -- continue statin   6. History of pancreatitis  -- avoid GLP-1RA, DPP4-i   7. Hypoglycemia due to Type 2 diabetes mellitus -- new problem  -- Reviewed hypoglycemia management: treat with 1/2 glass of juice, 1/2 can regular coke, or 4 glucose tablets. Monitor and repeat treatment every 15 minutes until BG is >70 Then have a snack, which includes a complex carbohydrate and protein.   Advised patient to check BG before activities, such as driving or exercise.   8.  Medication monitoring encounter -- Total Time and Counselin minutes, >50% time spent counseling as noted above in #1 A/P.           FOLLOW UP  Follow-up in about 2 months (around 10/9/2018).   Patient instructed to bring BG logs to each follow up.   Patient encouraged to call for any BG/medication issues, concerns, or questions.      External lab orders:  Orders Placed This Encounter   Procedures    Hemoglobin A1c    Comprehensive metabolic panel

## 2018-10-08 ENCOUNTER — OFFICE VISIT (OUTPATIENT)
Dept: ENDOCRINOLOGY | Facility: CLINIC | Age: 48
End: 2018-10-08
Payer: MEDICARE

## 2018-10-08 VITALS
HEART RATE: 72 BPM | BODY MASS INDEX: 39.37 KG/M2 | HEIGHT: 66 IN | RESPIRATION RATE: 18 BRPM | DIASTOLIC BLOOD PRESSURE: 64 MMHG | WEIGHT: 245 LBS | SYSTOLIC BLOOD PRESSURE: 90 MMHG

## 2018-10-08 DIAGNOSIS — Z86.73 HISTORY OF STROKE: ICD-10-CM

## 2018-10-08 DIAGNOSIS — N18.30 TYPE 2 DIABETES MELLITUS WITH STAGE 3 CHRONIC KIDNEY DISEASE AND HYPERTENSION: Primary | ICD-10-CM

## 2018-10-08 DIAGNOSIS — I12.9 TYPE 2 DIABETES MELLITUS WITH STAGE 3 CHRONIC KIDNEY DISEASE AND HYPERTENSION: Primary | ICD-10-CM

## 2018-10-08 DIAGNOSIS — Z87.19 HISTORY OF PANCREATITIS: ICD-10-CM

## 2018-10-08 DIAGNOSIS — Z79.4 TYPE 2 DIABETES MELLITUS WITH DIABETIC NEUROPATHY, WITH LONG-TERM CURRENT USE OF INSULIN: ICD-10-CM

## 2018-10-08 DIAGNOSIS — E11.22 TYPE 2 DIABETES MELLITUS WITH STAGE 3 CHRONIC KIDNEY DISEASE AND HYPERTENSION: Primary | ICD-10-CM

## 2018-10-08 DIAGNOSIS — I10 ESSENTIAL HYPERTENSION: ICD-10-CM

## 2018-10-08 DIAGNOSIS — E78.2 MIXED HYPERLIPIDEMIA: ICD-10-CM

## 2018-10-08 DIAGNOSIS — E11.40 TYPE 2 DIABETES MELLITUS WITH DIABETIC NEUROPATHY, WITH LONG-TERM CURRENT USE OF INSULIN: ICD-10-CM

## 2018-10-08 PROBLEM — N17.9 ACUTE RENAL FAILURE SUPERIMPOSED ON STAGE 3 CHRONIC KIDNEY DISEASE: Status: RESOLVED | Noted: 2017-01-03 | Resolved: 2018-10-08

## 2018-10-08 PROCEDURE — 99999 PR PBB SHADOW E&M-EST. PATIENT-LVL IV: CPT | Mod: PBBFAC,,, | Performed by: NURSE PRACTITIONER

## 2018-10-08 PROCEDURE — 99214 OFFICE O/P EST MOD 30 MIN: CPT | Mod: PBBFAC,PO | Performed by: NURSE PRACTITIONER

## 2018-10-08 PROCEDURE — 99213 OFFICE O/P EST LOW 20 MIN: CPT | Mod: S$PBB,,, | Performed by: NURSE PRACTITIONER

## 2018-10-08 RX ORDER — INSULIN DEGLUDEC 200 U/ML
50 INJECTION, SOLUTION SUBCUTANEOUS DAILY
Qty: 18 ML | Refills: 6 | Status: SHIPPED | OUTPATIENT
Start: 2018-10-08

## 2018-10-08 NOTE — PROGRESS NOTES
CC: Mr. Stephon Lock arrives today for management of Type 2 DM and review of chronic medical conditions, as listed in the Visit Diagnosis section of this encounter.         HPI: Mr. Stephon Lock was diagnosed with Type 2 DM in his mid 20s.   + FH of DM in half brother. He reports past hospitalization for DKA during the time he had pancreatitis in 2016.   DM is complicated by CVD, CKD, and neuropathy.   He follows with nephrology, Dr. Herrera, for CKD.        Does report that he had a bout of pneumonia and bronchitis two weeks ago- he did receive a medrol pack   brings logs from August checking 3-4 times per day    Has titrated down his tresiba since his last visit has he had hypos in the 50s in the am  Also reports his tresiba is $200 a month w insurance    Also was out of tresiba in August and subbed with lantus bg grossly elevated during that time   CVA 2014 w residual left sided weakness              Hypoglycemia: Reports 2 episodes in the 50s overnight after starting Tresiba.   Hypoglycemic Symptoms: sweating and jittery  Hypoglycemia Treatment: candy bar     Timing prandial insulin 5-15 minutes before meals: yes     Exercise: Starts PT tomorrow for left sided weakness  .      Dietary Habits: Eats 3 meals/day. Snacks mid afternoon and after dinner on chips. Avoids sugary beverages.      Last DM education appointment: 8/2018 - diet modifications discussed.        CURRENT DIABETIC MEDS: Tresiba U200  50 units QAM, Novolog 20 units AC + SSI, target 180, ISF 25  Vial or pen: pens  Glucometer type: True Metrix     Previous DM treatments:  Metformin - discontinued due to renal status  Lantus     Last Eye Exam: 9/2018 + cataracts.  Reports macular degeneration.   Last Podiatry Exam: n/a     REVIEW OF SYSTEMS  Constitutional: no c/o weight loss. + fatigue, weakness  Eyes: denies visual disturbances.  Cardiac: no palpitations or chest pain.  Respiratory: no cough or dyspnea.  GI: no c/o abdominal pain or nausea. +  h/o pancreatitis. no constipation   Skin: no lesions or rashes.   Neuro: + numbness, tingling to his L side since stroke.   Endocrine: denies polyphagia, polydipsia, polyuria    PHYSICAL EXAMINATION  Constitutional: Appears well, no distress  Neck: Supple, trachea midline   Respiratory: CTA, even and unlabored.   Skin: warm and dry; no lipohypertrophy, or acanthosis nigracans observed.  Psych: normal affect, pleasant mood, conversant  Feet:  appropriate footwear.    Hemoglobin A1C   Date Value Ref Range Status   12/29/2016 11.4 (H) 4.5 - 6.2 % Final     Comment:     According to ADA guidelines, hemoglobin A1C <7.0% represents  optimal control in non-pregnant diabetic patients.  Different  metrics may apply to specific populations.   Standards of Medical Care in Diabetes - 2016.  For the purpose of screening for the presence of diabetes:  <5.7%     Consistent with the absence of diabetes  5.7-6.4%  Consistent with increasing risk for diabetes   (prediabetes)  >or=6.5%  Consistent with diabetes  Currently no consensus exists for use of hemoglobin A1C  for diagnosis of diabetes for children.     04/03/2014 11.4 (H) 4.5 - 6.2 % Final   02/25/2013 13.4 (H) 4.0 - 6.2 % Final       Assessment/Plan  1. Type 2 diabetes mellitus with stage 3 chronic kidney disease and hypertension  ---- decrease Tresiba U200 to 50 units QAM only, Continue Novolog 20 units AC w correction scale, target 180, ISF 25  -- check BG 4x/day.  -- schedule eye exam     -- taking aspirin, statin, ARB   2. Type 2 diabetes mellitus with diabetic neuropathy, with long-term current use of insulin  -- discussed the importance of daily inspection of bottoms of both feet, interspace areas. Advised patient against walking barefoot.      3. History of stroke  -- avoid hypoglycemia. Optimize bg control    4. Essential hypertension  Continue current home meds   5. Hyperlipidemia, unspecified hyperlipidemia type  -- continue statin   6. History of pancreatitis  --  avoid GLP-1RA, DPP4-i   8 Vitamin d def Taking twice weekly, repeat lab w RTC

## 2018-10-15 ENCOUNTER — TELEPHONE (OUTPATIENT)
Dept: ENDOCRINOLOGY | Facility: CLINIC | Age: 48
End: 2018-10-15

## 2018-10-15 NOTE — TELEPHONE ENCOUNTER
Spoke with pt, advised records received advised A1c was 13, pt states his meter was 6 months old and strips are within date, pt will send logs on 10/22

## 2019-10-09 DIAGNOSIS — I12.9 TYPE 2 DIABETES MELLITUS WITH STAGE 3 CHRONIC KIDNEY DISEASE AND HYPERTENSION: ICD-10-CM

## 2019-10-09 DIAGNOSIS — N18.30 TYPE 2 DIABETES MELLITUS WITH STAGE 3 CHRONIC KIDNEY DISEASE AND HYPERTENSION: ICD-10-CM

## 2019-10-09 DIAGNOSIS — E11.22 TYPE 2 DIABETES MELLITUS WITH STAGE 3 CHRONIC KIDNEY DISEASE AND HYPERTENSION: ICD-10-CM

## 2019-10-09 RX ORDER — INSULIN DEGLUDEC 200 U/ML
INJECTION, SOLUTION SUBCUTANEOUS
OUTPATIENT
Start: 2019-10-09